# Patient Record
Sex: FEMALE | URBAN - METROPOLITAN AREA
[De-identification: names, ages, dates, MRNs, and addresses within clinical notes are randomized per-mention and may not be internally consistent; named-entity substitution may affect disease eponyms.]

---

## 2020-08-18 ENCOUNTER — TELEHEALTH PROVIDED OTHER THAN IN PATIENT'S HOME (OUTPATIENT)
Dept: URBAN - METROPOLITAN AREA TELEHEALTH 1 | Facility: TELEHEALTH | Age: 35
End: 2020-08-18
Payer: COMMERCIAL

## 2020-08-18 DIAGNOSIS — R10.30 LOWER ABDOMINAL PAIN, UNSPECIFIED: ICD-10-CM

## 2020-08-18 DIAGNOSIS — K52.9 NONINFECTIVE GASTROENTERITIS AND COLITIS, UNSPECIFIED: ICD-10-CM

## 2020-08-18 DIAGNOSIS — R15.2 FECAL URGENCY: ICD-10-CM

## 2020-08-18 DIAGNOSIS — K50.10 CROHN'S DISEASE OF LARGE INTESTINE WITHOUT COMPLICATIONS: ICD-10-CM

## 2020-08-18 PROCEDURE — 99203 OFFICE O/P NEW LOW 30 MIN: CPT | Mod: 95 | Performed by: INTERNAL MEDICINE

## 2020-09-28 ENCOUNTER — TRANSCRIBE ORDERS (OUTPATIENT)
Dept: ADMINISTRATIVE | Facility: HOSPITAL | Age: 35
End: 2020-09-28

## 2020-09-28 DIAGNOSIS — R74.8 ELEVATED LIVER ENZYMES: Primary | ICD-10-CM

## 2020-10-01 ENCOUNTER — HOSPITAL ENCOUNTER (OUTPATIENT)
Dept: ULTRASOUND IMAGING | Facility: HOSPITAL | Age: 35
Discharge: HOME | End: 2020-10-01
Admitting: INTERNAL MEDICINE

## 2020-10-01 DIAGNOSIS — R74.8 ELEVATED LIVER ENZYMES: ICD-10-CM

## 2020-10-01 PROCEDURE — 76705 ECHO EXAM OF ABDOMEN: CPT

## 2020-10-06 ENCOUNTER — LAB REQUISITION (OUTPATIENT)
Dept: LAB | Facility: HOSPITAL | Age: 35
End: 2020-10-06

## 2020-10-06 DIAGNOSIS — R19.8 OTHER SPECIFIED SYMPTOMS AND SIGNS INVOLVING THE DIGESTIVE SYSTEM AND ABDOMEN: ICD-10-CM

## 2020-10-06 DIAGNOSIS — D52.9 FOLATE DEFICIENCY ANEMIA, UNSPECIFIED: ICD-10-CM

## 2020-10-06 DIAGNOSIS — K50.10 CROHN'S DISEASE OF LARGE INTESTINE WITHOUT COMPLICATIONS (HCC): ICD-10-CM

## 2020-10-06 PROCEDURE — 83993 ASSAY FOR CALPROTECTIN FECAL: CPT | Performed by: INTERNAL MEDICINE

## 2020-10-13 LAB — CALPROTECTIN STL-MCNT: <16 UG/G (ref 0–120)

## 2020-11-10 ENCOUNTER — TELEHEALTH PROVIDED OTHER THAN IN PATIENT'S HOME (OUTPATIENT)
Dept: URBAN - METROPOLITAN AREA TELEHEALTH 1 | Facility: TELEHEALTH | Age: 35
End: 2020-11-10
Payer: COMMERCIAL

## 2020-11-10 DIAGNOSIS — R19.8 OTHER SPECIFIED SYMPTOMS AND SIGNS INVOLVING THE DIGESTIVE S: ICD-10-CM

## 2020-11-10 DIAGNOSIS — K76.0 FATTY (CHANGE OF) LIVER, NOT ELSEWHERE CLASSIFIED: ICD-10-CM

## 2020-11-10 DIAGNOSIS — K50.10 CROHN'S DISEASE OF LARGE INTESTINE WITHOUT COMPLICATIONS: ICD-10-CM

## 2020-11-10 DIAGNOSIS — K70.9 ALCOHOLIC LIVER DISEASE, UNSPECIFIED: ICD-10-CM

## 2020-11-10 PROCEDURE — 99214 OFFICE O/P EST MOD 30 MIN: CPT | Mod: 95 | Performed by: INTERNAL MEDICINE

## 2020-12-07 ENCOUNTER — TRANSCRIBE ORDERS (OUTPATIENT)
Dept: LAB | Facility: HOSPITAL | Age: 35
End: 2020-12-07

## 2020-12-07 ENCOUNTER — LAB (OUTPATIENT)
Dept: LAB | Facility: HOSPITAL | Age: 35
End: 2020-12-07

## 2020-12-07 DIAGNOSIS — R19.8 ANISMUS: ICD-10-CM

## 2020-12-07 DIAGNOSIS — K76.0 STEATOSIS OF LIVER: ICD-10-CM

## 2020-12-07 DIAGNOSIS — K50.10 CROHN'S DISEASE OF LARGE INTESTINE WITHOUT COMPLICATION (HCC): ICD-10-CM

## 2020-12-07 DIAGNOSIS — K70.9 ALCOHOL LIVER DAMAGE (HCC): ICD-10-CM

## 2020-12-07 DIAGNOSIS — K50.10 CROHN'S DISEASE OF LARGE INTESTINE WITHOUT COMPLICATION (HCC): Primary | ICD-10-CM

## 2020-12-07 PROCEDURE — 83010 ASSAY OF HAPTOGLOBIN QUANT: CPT

## 2020-12-07 PROCEDURE — 84478 ASSAY OF TRIGLYCERIDES: CPT

## 2020-12-07 PROCEDURE — 84450 TRANSFERASE (AST) (SGOT): CPT

## 2020-12-07 PROCEDURE — 83883 ASSAY NEPHELOMETRY NOT SPEC: CPT

## 2020-12-07 PROCEDURE — 82977 ASSAY OF GGT: CPT

## 2020-12-07 PROCEDURE — 84460 ALANINE AMINO (ALT) (SGPT): CPT

## 2020-12-07 PROCEDURE — 82947 ASSAY GLUCOSE BLOOD QUANT: CPT

## 2020-12-07 PROCEDURE — 82247 BILIRUBIN TOTAL: CPT

## 2020-12-07 PROCEDURE — 82172 ASSAY OF APOLIPOPROTEIN: CPT

## 2020-12-07 PROCEDURE — 82465 ASSAY BLD/SERUM CHOLESTEROL: CPT

## 2020-12-07 PROCEDURE — 36415 COLL VENOUS BLD VENIPUNCTURE: CPT

## 2020-12-10 LAB
A2 MACROGLOB SERPL-MCNC: 146 MG/DL (ref 110–276)
ALT SERPL W P-5'-P-CCNC: 160 IU/L (ref 0–40)
APO A-I SERPL-MCNC: 183 MG/DL (ref 116–209)
ASH SCORING: ABNORMAL
AST SERPL W P-5'-P-CCNC: 87 IU/L (ref 0–40)
BILIRUB SERPL-MCNC: 0.6 MG/DL (ref 0–1.2)
CHOLEST SERPL-MCNC: 263 MG/DL (ref 100–199)
FIBROSIS SCORING:: ABNORMAL
FIBROSIS STAGE SERPL QL: ABNORMAL
GGT SERPL-CCNC: 144 IU/L (ref 0–60)
GLUCOSE SERPL-MCNC: 111 MG/DL (ref 65–99)
HAPTOGLOB SERPL-MCNC: 102 MG/DL (ref 33–278)
INTERPRETATION: ABNORMAL
LABORATORY COMMENT REPORT: ABNORMAL
LIVER FIBR SCORE SERPL CALC.FIBROSURE: 0.12 (ref 0–0.21)
NECROINFLAMMATORY ACT GRADE SERPL QL: ABNORMAL
NECROINFLAMMATORY ACT SCORE SERPL: 0 (ref 0–17)
SERVICE CMNT-IMP: ABNORMAL
STEATOSIS GRADE (REFERENCE): ABNORMAL
STEATOSIS GRADING (REFERENCE): ABNORMAL
STEATOSIS SCORE (REFERENCE): 0.83 (ref 0–0.3)
TRIGL SERPL-MCNC: 149 MG/DL (ref 0–149)

## 2021-04-30 RX ORDER — ESCITALOPRAM OXALATE 20 MG/1
20 TABLET ORAL DAILY
COMMUNITY
End: 2022-04-07

## 2021-04-30 RX ORDER — CLOTRIMAZOLE 10 MG/1
10 LOZENGE ORAL; TOPICAL
COMMUNITY

## 2021-04-30 RX ORDER — PROPRANOLOL HYDROCHLORIDE 40 MG/1
40 TABLET ORAL DAILY PRN
COMMUNITY

## 2021-04-30 RX ORDER — FLUOCINOLONE ACETONIDE 0.1 MG/ML
SOLUTION TOPICAL 2 TIMES DAILY
COMMUNITY
End: 2022-04-07

## 2021-04-30 RX ORDER — KETOCONAZOLE 20 MG/ML
SHAMPOO TOPICAL 2 TIMES WEEKLY
COMMUNITY
End: 2022-04-07

## 2021-04-30 RX ORDER — PANTOPRAZOLE SODIUM 40 MG/1
40 TABLET, DELAYED RELEASE ORAL DAILY
COMMUNITY
End: 2021-05-04 | Stop reason: SDUPTHER

## 2021-04-30 RX ORDER — USTEKINUMAB 90 MG/ML
INJECTION, SOLUTION SUBCUTANEOUS
COMMUNITY
End: 2021-11-03 | Stop reason: SDUPTHER

## 2021-04-30 RX ORDER — LEVOTHYROXINE SODIUM 175 UG/1
175 TABLET ORAL DAILY
COMMUNITY

## 2021-04-30 RX ORDER — BUSPIRONE HYDROCHLORIDE 10 MG/1
10 TABLET ORAL 3 TIMES DAILY
COMMUNITY
End: 2022-04-07

## 2021-04-30 RX ORDER — KETOCONAZOLE 20 MG/G
CREAM TOPICAL DAILY
COMMUNITY

## 2021-04-30 RX ORDER — MELATONIN
1000 DAILY
COMMUNITY

## 2021-05-04 ENCOUNTER — OFFICE VISIT (OUTPATIENT)
Dept: GASTROENTEROLOGY | Facility: CLINIC | Age: 36
End: 2021-05-04

## 2021-05-04 ENCOUNTER — LAB (OUTPATIENT)
Dept: LAB | Facility: HOSPITAL | Age: 36
End: 2021-05-04

## 2021-05-04 VITALS
WEIGHT: 184 LBS | TEMPERATURE: 98 F | SYSTOLIC BLOOD PRESSURE: 112 MMHG | DIASTOLIC BLOOD PRESSURE: 80 MMHG | HEIGHT: 63 IN | BODY MASS INDEX: 32.6 KG/M2

## 2021-05-04 DIAGNOSIS — Z12.11 ENCOUNTER FOR SCREENING FOR MALIGNANT NEOPLASM OF COLON: ICD-10-CM

## 2021-05-04 DIAGNOSIS — K50.80 CROHN'S DISEASE OF BOTH SMALL AND LARGE INTESTINE WITHOUT COMPLICATION (HCC): Primary | ICD-10-CM

## 2021-05-04 DIAGNOSIS — E66.09 CLASS 1 OBESITY DUE TO EXCESS CALORIES WITHOUT SERIOUS COMORBIDITY WITH BODY MASS INDEX (BMI) OF 32.0 TO 32.9 IN ADULT: ICD-10-CM

## 2021-05-04 DIAGNOSIS — K50.80 CROHN'S DISEASE OF BOTH SMALL AND LARGE INTESTINE WITHOUT COMPLICATION (HCC): ICD-10-CM

## 2021-05-04 DIAGNOSIS — K21.9 GASTROESOPHAGEAL REFLUX DISEASE WITHOUT ESOPHAGITIS: ICD-10-CM

## 2021-05-04 DIAGNOSIS — R79.89 ELEVATED LFTS: ICD-10-CM

## 2021-05-04 LAB
25(OH)D3 SERPL-MCNC: 34.5 NG/ML (ref 30–100)
ALBUMIN SERPL-MCNC: 4.5 G/DL (ref 3.5–5.2)
ALBUMIN/GLOB SERPL: 1.5 G/DL
ALP SERPL-CCNC: 76 U/L (ref 39–117)
ALT SERPL W P-5'-P-CCNC: 107 U/L (ref 1–33)
ANION GAP SERPL CALCULATED.3IONS-SCNC: 12.5 MMOL/L (ref 5–15)
AST SERPL-CCNC: 92 U/L (ref 1–32)
BILIRUB SERPL-MCNC: 0.5 MG/DL (ref 0–1.2)
BUN SERPL-MCNC: 12 MG/DL (ref 6–20)
BUN/CREAT SERPL: 14.3 (ref 7–25)
CALCIUM SPEC-SCNC: 9.1 MG/DL (ref 8.6–10.5)
CERULOPLASMIN SERPL-MCNC: 32 MG/DL (ref 19–39)
CHLORIDE SERPL-SCNC: 102 MMOL/L (ref 98–107)
CO2 SERPL-SCNC: 24.5 MMOL/L (ref 22–29)
CREAT SERPL-MCNC: 0.84 MG/DL (ref 0.57–1)
FERRITIN SERPL-MCNC: 192 NG/ML (ref 13–150)
FOLATE SERPL-MCNC: <2 NG/ML (ref 4.78–24.2)
GFR SERPL CREATININE-BSD FRML MDRD: 76 ML/MIN/1.73
GLOBULIN UR ELPH-MCNC: 3 GM/DL
GLUCOSE SERPL-MCNC: 92 MG/DL (ref 65–99)
HBV SURFACE AB SER RIA-ACNC: REACTIVE
HBV SURFACE AG SERPL QL IA: NORMAL
HCV AB SER DONR QL: NORMAL
IRON 24H UR-MRATE: 77 MCG/DL (ref 37–145)
IRON SATN MFR SERPL: 21 % (ref 20–50)
POTASSIUM SERPL-SCNC: 4 MMOL/L (ref 3.5–5.2)
PROT SERPL-MCNC: 7.5 G/DL (ref 6–8.5)
SODIUM SERPL-SCNC: 139 MMOL/L (ref 136–145)
TIBC SERPL-MCNC: 361 MCG/DL (ref 298–536)
TRANSFERRIN SERPL-MCNC: 242 MG/DL (ref 200–360)
VIT B12 BLD-MCNC: 529 PG/ML (ref 211–946)

## 2021-05-04 PROCEDURE — 82728 ASSAY OF FERRITIN: CPT

## 2021-05-04 PROCEDURE — 80053 COMPREHEN METABOLIC PANEL: CPT

## 2021-05-04 PROCEDURE — 83516 IMMUNOASSAY NONANTIBODY: CPT

## 2021-05-04 PROCEDURE — 99204 OFFICE O/P NEW MOD 45 MIN: CPT | Performed by: INTERNAL MEDICINE

## 2021-05-04 PROCEDURE — 82306 VITAMIN D 25 HYDROXY: CPT | Performed by: INTERNAL MEDICINE

## 2021-05-04 PROCEDURE — 85025 COMPLETE CBC W/AUTO DIFF WBC: CPT

## 2021-05-04 PROCEDURE — 82746 ASSAY OF FOLIC ACID SERUM: CPT

## 2021-05-04 PROCEDURE — 84466 ASSAY OF TRANSFERRIN: CPT

## 2021-05-04 PROCEDURE — 86038 ANTINUCLEAR ANTIBODIES: CPT

## 2021-05-04 PROCEDURE — 36415 COLL VENOUS BLD VENIPUNCTURE: CPT

## 2021-05-04 PROCEDURE — 83540 ASSAY OF IRON: CPT

## 2021-05-04 PROCEDURE — 86706 HEP B SURFACE ANTIBODY: CPT

## 2021-05-04 PROCEDURE — 82607 VITAMIN B-12: CPT

## 2021-05-04 PROCEDURE — 86704 HEP B CORE ANTIBODY TOTAL: CPT

## 2021-05-04 PROCEDURE — 86708 HEPATITIS A ANTIBODY: CPT

## 2021-05-04 PROCEDURE — 85007 BL SMEAR W/DIFF WBC COUNT: CPT

## 2021-05-04 PROCEDURE — 82390 ASSAY OF CERULOPLASMIN: CPT

## 2021-05-04 PROCEDURE — 86803 HEPATITIS C AB TEST: CPT

## 2021-05-04 PROCEDURE — 82104 ALPHA-1-ANTITRYPSIN PHENO: CPT

## 2021-05-04 PROCEDURE — 82103 ALPHA-1-ANTITRYPSIN TOTAL: CPT

## 2021-05-04 PROCEDURE — 87340 HEPATITIS B SURFACE AG IA: CPT

## 2021-05-04 RX ORDER — PANTOPRAZOLE SODIUM 20 MG/1
40 TABLET, DELAYED RELEASE ORAL DAILY
Qty: 30 TABLET | Refills: 5 | Status: SHIPPED | OUTPATIENT
Start: 2021-05-04 | End: 2021-08-11

## 2021-05-04 NOTE — PROGRESS NOTES
New Patient Consult      Date: 2021   Patient Name: Ashley Riley  MRN: [id number]  : 1985     Referring Physician: Irish Villatoro*    Chief Complaint   Patient presents with   • Crohn's Disease       History of Present Illness: Ashley Riley is a 36 y.o. female who is here today to establish care with Gastroenterology for further evaluation management of her Crohn's disease and suspected nonalcoholic fatty liver disease.  She was diagnosed with Crohn's disease of the small and large intestine in  with a colonoscopy and biopsies In [place] at [facility] and .  She was followed at [facility] before.  She was on Cimzia for about 5 yrs and imuran for 3 yrs. She was put on stelara 2020. She is currently on Stelara 90 mg subcu inj.. She will have bowel movement 2-3 times per day, loose. No blood in stool. She had recent one episode of lower abdomen week ago that has resolved. She did have intraabdominal abscess in   And had a drainage. No bowel resection.     This patient deny any melena.  Weight is stable. Pt denies nausea vomiting or odynophagia or dysphagia. There is history of acid reflux under well control with ppi. There is no history of anemia. No recent history of EGD. Last colonoscopy was  at [place]. As per patient there was not much inflammation.   Family history of colon cancer- Grandfather had colon Cancer and father had stomach cancer. No history of any abdominal surgery. She will have wine couple of glass daily and she is a chronic smoker.     Subjective      Past Medical History:   Past Medical History:   Diagnosis Date   • Alcoholic liver damage (CMS/HCC)    • Anxiety    • Bipolar disorder (CMS/HCC)    • Bronchitis    • Crohn's disease (CMS/HCC)    • Depression    • Drug-induced neutropenia (CMS/HCC)    • H/O malignant neoplasm of thyroid    • Nasal lesion    • WEATHERS (nonalcoholic steatohepatitis)    • Olecranon bursitis    • Vitamin  B12 deficiency        Past Surgical History:   Past Surgical History:   Procedure Laterality Date   • COLONOSCOPY     • ENDOSCOPY     • THYROIDECTOMY         Family History: History reviewed. No pertinent family history.    Social History:   Social History     Socioeconomic History   • Marital status: Single     Spouse name: Not on file   • Number of children: Not on file   • Years of education: Not on file   • Highest education level: Not on file   Tobacco Use   • Smoking status: Current Every Day Smoker   • Smokeless tobacco: Never Used   Substance and Sexual Activity   • Alcohol use: Yes     Comment: DAILY ALCOHOL USE   • Drug use: Never   • Sexual activity: Defer         Current Outpatient Medications:   •  busPIRone (BUSPAR) 10 MG tablet, Take 10 mg by mouth 3 (Three) Times a Day., Disp: , Rfl:   •  cholecalciferol (VITAMIN D3) 25 MCG (1000 UT) tablet, Take 1,000 Units by mouth Daily., Disp: , Rfl:   •  clotrimazole (MYCELEX) 10 MG amos, Take 10 mg by mouth 5 (Five) Times a Day., Disp: , Rfl:   •  Cyanocobalamin 1000 MCG/ML kit, Inject  as directed., Disp: , Rfl:   •  escitalopram (LEXAPRO) 20 MG tablet, Take 20 mg by mouth Daily., Disp: , Rfl:   •  fluocinolone (SYNALAR) 0.01 % external solution, Apply  topically to the appropriate area as directed 2 (Two) Times a Day., Disp: , Rfl:   •  ketoconazole (NIZORAL) 2 % cream, Apply  topically to the appropriate area as directed Daily., Disp: , Rfl:   •  ketoconazole (NIZORAL) 2 % shampoo, Apply  topically to the appropriate area as directed 2 (Two) Times a Week., Disp: , Rfl:   •  levothyroxine (SYNTHROID, LEVOTHROID) 175 MCG tablet, Take 175 mcg by mouth Daily., Disp: , Rfl:   •  mupirocin (BACTROBAN) 2 % ointment, Apply  topically to the appropriate area as directed 3 (Three) Times a Day., Disp: , Rfl:   •  pantoprazole (PROTONIX) 20 MG EC tablet, Take 2 tablets by mouth Daily., Disp: 30 tablet, Rfl: 5  •  propranolol (INDERAL) 40 MG tablet, Take 40 mg by mouth  "3 (Three) Times a Day., Disp: , Rfl:   •  Ustekinumab (Stelara) 90 MG/ML solution prefilled syringe Injection, Inject  under the skin into the appropriate area as directed., Disp: , Rfl:     Allergies   Allergen Reactions   • Remicade [Infliximab] Anaphylaxis     Ask patient        Review of Systems:   Review of Systems   Constitutional: Negative for appetite change, fatigue, fever and unexpected weight loss.   HENT: Negative for trouble swallowing.    Respiratory: Negative for cough, shortness of breath and wheezing.    Cardiovascular: Negative for chest pain, palpitations and leg swelling.   Gastrointestinal: Positive for abdominal pain, constipation, diarrhea, nausea and GERD. Negative for abdominal distention, anal bleeding, blood in stool, rectal pain, vomiting and indigestion.   Genitourinary: Negative for dysuria, frequency and hematuria.   Musculoskeletal: Negative for back pain and joint swelling.   Skin: Negative for color change, rash and skin lesions.   Neurological: Negative for dizziness, syncope, speech difficulty, weakness, headache and memory problem.   Hematological: Negative for adenopathy. Does not bruise/bleed easily.   Psychiatric/Behavioral: Negative for agitation, behavioral problems, suicidal ideas and depressed mood.       The following portions of the patient's history were reviewed and updated as appropriate: allergies, current medications, past family history, past medical history, past social history, past surgical history and problem list.    Objective     Physical Exam:  Vital Signs:   Vitals:    05/04/21 1356   BP: 112/80   Temp: 98 °F (36.7 °C)   TempSrc: Temporal   Weight: 83.5 kg (184 lb)   Height: 160 cm (63\")       Physical Exam  Vitals and nursing note reviewed.   Constitutional:       Appearance: She is well-developed. She is obese.   HENT:      Head: Normocephalic and atraumatic.      Right Ear: External ear normal.      Left Ear: External ear normal.   Eyes:      " Conjunctiva/sclera: Conjunctivae normal.   Neck:      Thyroid: No thyromegaly.      Trachea: No tracheal deviation.   Cardiovascular:      Rate and Rhythm: Normal rate and regular rhythm.      Heart sounds: No murmur heard.     Pulmonary:      Effort: Pulmonary effort is normal. No respiratory distress.      Breath sounds: Normal breath sounds.   Abdominal:      General: Bowel sounds are normal. There is no distension.      Palpations: Abdomen is soft. There is no mass.      Tenderness: There is abdominal tenderness (Mild diffuse discomfort on the palpation).      Hernia: No hernia is present.   Musculoskeletal:         General: Normal range of motion.      Cervical back: Normal range of motion.   Skin:     General: Skin is warm and dry.   Neurological:      General: No focal deficit present.      Mental Status: She is alert and oriented to person, place, and time.      Cranial Nerves: No cranial nerve deficit.      Sensory: No sensory deficit.   Psychiatric:         Mood and Affect: Mood normal.         Behavior: Behavior normal.         Thought Content: Thought content normal.         Judgment: Judgment normal.         Results Review:   I have reviewed the patient's new clinical and imaging results and agree with the interpretation.     No visits with results within 90 Day(s) from this visit.   Latest known visit with results is:   Lab on 12/07/2020   Component Date Value Ref Range Status   • Fibrosis Score (References) 12/07/2020 0.12  0.00 - 0.21 Final   • Fibrosis Stage (Reference) 12/07/2020 Comment   Final                       F0 - No fibrosis   • Steatosis Score (Reference) 12/07/2020 0.83* 0.00 - 0.30 Final   • Steatosis Grade (Reference) 12/07/2020 Comment   Final                S3 - Marked or Severe Steatosis   • MELVIN Score 12/07/2020 0.00  0.00 - 17.00 Final   • MELVIN Grade 12/07/2020 Comment   Final                          H0 - No MELVIN   • Height: (Reference) 12/07/2020 63  in Final   • Weight:  (Reference) 12/07/2020 188  LBS Final   • Alpha 2-Macroglobulins, Qn 12/07/2020 146  110 - 276 mg/dL Final   • Haptoglobin 12/07/2020 102  33 - 278 mg/dL Final   • Apolipoprotein A-1 12/07/2020 183  116 - 209 mg/dL Final   • Total Bilirubin 12/07/2020 0.6  0.0 - 1.2 mg/dL Final   • GGT 12/07/2020 144* 0 - 60 IU/L Final   • ALT (SGPT) 12/07/2020 160* 0 - 40 IU/L Final   • AST (SGOT) P5P (Reference) 12/07/2020 87* 0 - 40 IU/L Final   • Cholesterol, Total (Reference) 12/07/2020 263* 100 - 199 mg/dL Final   • Glucose, Serum (Reference) 12/07/2020 111* 65 - 99 mg/dL Final   • Triglycerides 12/07/2020 149  0 - 149 mg/dL Final   • Interpretation 12/07/2020 Comment   Final    Comment: Quantitative results of 10 biochemicals in combination with age,  gender, height and weight, are analyzed using a computational  algorithm to provide a quantitative surrogate marker (0.0-1.0) of  liver fibrosis (Metavir F0-F4), hepatic steatosis (0.0-1.0, S0-S3),  and Alcoholic Steato-Hepatitis (MELVIN) (0.0-1.0, H0-H3).  Fibrosis Marker:  In a study of 221 alcoholic patients where 63% had  significant alcoholic fibrosis (Metavir F2-F4) and 31% had cirrhosis  by liver biopsy, a fibrosis result of >0.3 yielded a sensitivity of  84% and a specificity of 66% for the detection of significant  fibrosis.  A fibrosis result of >0.7 yielded a sensitivity of 91% and  a specificity of 87% for detection of cirrhosis(1).  Steatosis Marker:  In a population of 744 patients (583 HCV, 18 HBV,  69 NAFLD, and 74 alcoholic disease patients), where 36% had  significant steatosis (>5%) on a liver biopsy, a steatosis score >0.5  had a sensitivity of 71% and a specificity of 72% for identification  of                            significant steatosis(2).  MELVIN Marker:  In a population of 225 alcoholic patients where 34% had  alcoholic hepatitis features (polynuclear neutrophil infiltrate and  hepatocellular necrosis) by liver biopsy, an Alcoholic Steato-  Hepatitis  value >0.5 had a sensitivity of 80% and a specificity of 84%  in identifying alcoholic steato-hepatitis(3).   • Fibrosis Scorin2020 Comment   Final          <0.21 = Stage F0 - No fibrosis  0.21 - 0.27 = Stage F0 - F1  0.27 - 0.31 = Stage F1 - Portal fibrosis  0.31 - 0.48 = Stage F1 - F2  0.48 - 0.58 = Stage F2 - Bridging fibrosis with few septa  0.58 - 0.72 = Stage F3 - Bridging fibrosis with many septa  0.72 - 0.74 = Stage F3 - F4        >0.74 = Stage F4 - Cirrhosis   • Steatosis Grading (Reference) 2020 Comment   Final          < 0.30 = S0 - No Steatosis  0.30 to 0.38 = S0 - S1  0.38 to 0.48 = S1 - Minimal Steatosis  0.48 to 0.57 = S1 - S2  0.57 to 0.67 = S2 - Moderate Steatosis  0.67 to 0.69 = S2 - S3        > 0.69 = S3 - Marked or Severe Steatosis   • MELVIN Scoring 2020 Comment   Final            < 0.1700 = H0 - No MELVIN  0.1700 to 0.5535 = H1 - Mild MELVIN  0.5535 to 0.7800 = H2 - Moderate MELVIN          > 0.7800 = H3 - Severe MELVIN   • Limitations: (Reference) 2020 Comment   Final    MELVIN FibroSure is recommended for patients with suspected alcoholic  liver disease.  It is not recommended for patients with other liver  diseases.  It is also not recommended in patients with Gilbert  Disease, acute hemolysis, acute viral hepatitis, drug induced  hepatitis, genetic liver disease, autoimmune hepatitis, and/or extra-  hepatic cholestasis.  Any of these clinical situations may lead to  inaccurate quantitative predictions of fibrosis.   • Comment (Reference) 2020 Comment   Final    This test was developed and its performance characteristics determined  by [facility].  It has not been cleared or approved by the Food and Drug  Administration.  The FDA has determined that such clearance or  approval is not necessary.  For questions regarding this report please contact customer service  at [phone].  References:  1.  Claudia Hargrove al. Biomarkers for the Prediction of Liver Fibrosis  in Patients  with Chronic Alcoholic Liver Disease.  Clinical  Gastroenterol. / Hepatol. 2005;3:167-174.  2.  DAMIAN Cummins. et al. The Diagnostic Value of Biomarkers (Steato  Test) for the Prediction of Liver Steatosis.  Comparative Hepatol.  2005; 4:10.  3.  Spencer RICKETTS et al. The Diagnostic Value of Biomarkers (North Test) for  the Prediction of Alcoholic Steato-hepatitis in Patients with Chronic  Alcoholic Liver Disease.  J Hepatol. 2006;44:1175-85.      No radiology results for the last 90 days.    Assessment / Plan      Assessment & Plan:  1. Crohn's disease of both small and large intestine without complication (CMS/HCC)  2.  Suspected mild irritable bowel  Patient was diagnosed with ileocolonic Crohn's disease in 2014 at [facility].  She was initially on a Cimzia and Imuran.  It is unclear why the Cimzia was changed to Stelara.  Patient could not give a clear answer on that.  She has been taking Stelara since January 2020.  She did have a prior history of Crohn's abscess in 2016 and had a drainage but no bowel resections.    She is clinically appears to be doing well with the Stelara now.  Her last dose was 2 weeks ago.  Last fecal calprotectin in October 2020 was normal.   Her history is suggestive of mild irritable bowel along with her IBD.  Her last colonoscopy was in 2019 as per patient and was been told that no significant inflammation was noted the colonoscopy.  No full report available to interpret.    Patient currently appears to be in a clinical remission without any significant signs of flare.     We will get a CBC CMP vitamin B12 level, folic acid level, vitamin D level  We will also get a stool calprotectin  We will continue her self Stelara 90 mg subcu q. 8 weeks  Colonoscopy in 2022  Follow-up in 3 months time    - Calprotectin, Fecal - Stool, Per Rectum; Future  - Comprehensive Metabolic Panel; Future  - CBC Auto Differential; Future  - Vitamin B12; Future  - Folate; Future  - Vitamin D  25 Hydroxy    2. Gastroesophageal reflux disease without esophagitis  Patient is a chronic smoker and chronic alcoholic.   We had a detailed discussion today to cut down both and to reduce the Protonix to 20 mg p.o. daily for now    3. Elevated LFTs  AIH versus Mckeon  Hepatocellular pattern of elevated liver enzymes with ALT more than 45 times upper limit of normal.  This is concerning for autoimmune hepatitis.   Her Mckeon fibrosure done by PCP revealed N3 steatosis without any fibrosis or steatohepatitis  We will get the rest of the blood work done to rule out other etiologies.   Meantime we had a detailed discussion on reducing weight at least 10 pounds for the next 6 months time.  Also discussed on cut down or stop smoking and cut down the alcohol use during the progression of the liver disease.    - Hepatitis A Antibody, Total; Future  - Hepatitis B Core Antibody, Total; Future  - Hepatitis B Surface Antibody; Future  - Hepatitis B Surface Antigen; Future  - Ceruloplasmin; Future  - JUAN; Future  - Anti-Smooth Muscle Antibody Titer; Future  - Mitochondrial Antibodies, M2; Future  - Hepatitis C Antibody; Future  - Ferritin; Future  - Iron Profile; Future  - Alpha - 1 - Antitrypsin Phenotype; Future  - Alpha - 1 - Antitrypsin; Future    4. Encounter for screening for malignant neoplasm of colon  5.  Family history of colon cancer  Last colonoscopy was 2019 no polyps removed.  Her grandfather had a colon cancer she is due for surveillance colonoscopy next year in 2022    6. Class 1 obesity due to excess calories without serious comorbidity with body mass index (BMI) of 32.0 to 32.9 in adult  Advised regular exercise half hour every day at least 3 days in a week.   Reduced calorie intake  and lifestyle and dietary changes  have been discussed  Wt reduction of at least 5-7% of the current body weight has been discussed.   Advised to avoid alcohol and smoking cigarette      Follow Up:   Return in about 3 months (around  8/4/2021).    Abhay Rascon MD  Gastroenterology [place]  5/4/2021  15:08 EDT    Please note that portions of this note may have been completed with a voice recognition program.

## 2021-05-05 LAB
ANISOCYTOSIS BLD QL: ABNORMAL
BASOPHILS # BLD MANUAL: 0.05 10*3/MM3 (ref 0–0.2)
BASOPHILS NFR BLD AUTO: 2 % (ref 0–1.5)
DACRYOCYTES BLD QL SMEAR: ABNORMAL
DEPRECATED RDW RBC AUTO: 64.8 FL (ref 37–54)
EOSINOPHIL # BLD MANUAL: 0.02 10*3/MM3 (ref 0–0.4)
EOSINOPHIL NFR BLD MANUAL: 1 % (ref 0.3–6.2)
ERYTHROCYTE [DISTWIDTH] IN BLOOD BY AUTOMATED COUNT: 16.1 % (ref 12.3–15.4)
HCT VFR BLD AUTO: 36.9 % (ref 34–46.6)
HGB BLD-MCNC: 12.7 G/DL (ref 12–15.9)
LYMPHOCYTES # BLD MANUAL: 1.14 10*3/MM3 (ref 0.7–3.1)
LYMPHOCYTES NFR BLD MANUAL: 46 % (ref 19.6–45.3)
LYMPHOCYTES NFR BLD MANUAL: 7 % (ref 5–12)
MACROCYTES BLD QL SMEAR: ABNORMAL
MCH RBC QN AUTO: 37.9 PG (ref 26.6–33)
MCHC RBC AUTO-ENTMCNC: 34.4 G/DL (ref 31.5–35.7)
MCV RBC AUTO: 110.1 FL (ref 79–97)
MONOCYTES # BLD AUTO: 0.17 10*3/MM3 (ref 0.1–0.9)
NEUTROPHILS # BLD AUTO: 1.09 10*3/MM3 (ref 1.7–7)
NEUTROPHILS NFR BLD MANUAL: 44 % (ref 42.7–76)
PLAT MORPH BLD: NORMAL
PLATELET # BLD AUTO: 213 10*3/MM3 (ref 140–450)
PMV BLD AUTO: 10.6 FL (ref 6–12)
POLYCHROMASIA BLD QL SMEAR: ABNORMAL
RBC # BLD AUTO: 3.35 10*6/MM3 (ref 3.77–5.28)
WBC # BLD AUTO: 2.48 10*3/MM3 (ref 3.4–10.8)
WBC MORPH BLD: NORMAL

## 2021-05-05 PROCEDURE — 83993 ASSAY FOR CALPROTECTIN FECAL: CPT

## 2021-05-06 LAB
ANA SER QL: NEGATIVE
HAV AB SER QL IA: NEGATIVE
HBV CORE AB SERPL QL IA: NEGATIVE

## 2021-05-07 LAB
ACTIN IGG SERPL-ACNC: 12 UNITS (ref 0–19)
CALPROTECTIN STL-MCNT: 42 UG/G (ref 0–120)

## 2021-05-08 LAB — MITOCHONDRIA M2 IGG SER-ACNC: <20 UNITS (ref 0–20)

## 2021-05-10 LAB
A1AT PHENOTYP SERPL IFE: NORMAL
A1AT SERPL-MCNC: 125 MG/DL (ref 100–188)

## 2021-08-04 ENCOUNTER — OFFICE VISIT (OUTPATIENT)
Dept: GASTROENTEROLOGY | Facility: CLINIC | Age: 36
End: 2021-08-04

## 2021-08-04 VITALS
DIASTOLIC BLOOD PRESSURE: 78 MMHG | SYSTOLIC BLOOD PRESSURE: 118 MMHG | TEMPERATURE: 97.7 F | HEIGHT: 63 IN | BODY MASS INDEX: 32.43 KG/M2 | WEIGHT: 183 LBS

## 2021-08-04 DIAGNOSIS — K50.80 CROHN'S DISEASE OF BOTH SMALL AND LARGE INTESTINE WITHOUT COMPLICATION (HCC): Primary | ICD-10-CM

## 2021-08-04 PROCEDURE — 99214 OFFICE O/P EST MOD 30 MIN: CPT | Performed by: INTERNAL MEDICINE

## 2021-08-04 RX ORDER — FOLIC ACID 1 MG/1
1 TABLET ORAL DAILY
Qty: 30 TABLET | Refills: 5 | Status: SHIPPED | OUTPATIENT
Start: 2021-08-04 | End: 2022-02-02 | Stop reason: SDUPTHER

## 2021-08-04 NOTE — PROGRESS NOTES
Follow Up Note     Date: 2021   Patient Name: Ashley Riley  MRN: [id number]  : 1985     Referring Physician: Irish Villatoro MD    Chief Complaint:    Chief Complaint   Patient presents with   • Follow-up   • Crohn's Disease       Interval History:   2021  Ashley Riley is a 36 y.o. female who is here today for follow up for her Crohn's disease.  She states that she has been having a regular bowel movement until recently.  After her recent vacation she developed some loose stools anywhere from 3 to 5/day without any blood without any abdominal pain.  She had recent blood work and she is here to discuss the reports and follow-up..    2021  Ashley Riley is a 36 y.o. female who is here today to establish care with Gastroenterology for further evaluation management of her Crohn's disease and suspected nonalcoholic fatty liver disease.  She was diagnosed with Crohn's disease of the small and large intestine in  with a colonoscopy and biopsies In [place] at [facility] and .  She was followed at [facility] colorectal [facility] before.  She was on Cimzia for about 5 yrs and imuran for 3 yrs. She was put on stelara 2020. She is currently on Stelara 90 mg subcu inj.. She will have bowel movement 2-3 times per day, loose. No blood in stool. She had recent one episode of lower abdomen week ago that has resolved. She did have intraabdominal abscess in   And had a drainage. No bowel resection.      This patient deny any melena.  Weight is stable. Pt denies nausea vomiting or odynophagia or dysphagia. There is history of acid reflux under well control with ppi. There is no history of anemia. No recent history of EGD. Last colonoscopy was  at [place]. As per patient there was not much inflammation.   Family history of colon cancer- Grandfather had colon Cancer and father had stomach cancer. No history of any abdominal surgery. She will have wine couple of glass daily and  she is a chronic smoker.     Subjective      Past Medical History:   Past Medical History:   Diagnosis Date   • Alcoholic liver damage (CMS/HCC)    • Anxiety    • Bipolar disorder (CMS/HCC)    • Bronchitis    • Crohn's disease (CMS/HCC)    • Depression    • Drug-induced neutropenia (CMS/HCC)    • H/O malignant neoplasm of thyroid    • Nasal lesion    • WEATHERS (nonalcoholic steatohepatitis)    • Olecranon bursitis    • Vitamin B12 deficiency      Past Surgical History:   Past Surgical History:   Procedure Laterality Date   • COLONOSCOPY     • ENDOSCOPY     • THYROIDECTOMY         Family History: History reviewed. No pertinent family history.    Social History:   Social History     Socioeconomic History   • Marital status: Single     Spouse name: Not on file   • Number of children: Not on file   • Years of education: Not on file   • Highest education level: Not on file   Tobacco Use   • Smoking status: Current Every Day Smoker   • Smokeless tobacco: Never Used   Substance and Sexual Activity   • Alcohol use: Yes     Comment: DAILY ALCOHOL USE   • Drug use: Never   • Sexual activity: Defer       Medications:     Current Outpatient Medications:   •  busPIRone (BUSPAR) 10 MG tablet, Take 10 mg by mouth 3 (Three) Times a Day., Disp: , Rfl:   •  cholecalciferol (VITAMIN D3) 25 MCG (1000 UT) tablet, Take 1,000 Units by mouth Daily., Disp: , Rfl:   •  clotrimazole (MYCELEX) 10 MG amos, Take 10 mg by mouth 5 (Five) Times a Day., Disp: , Rfl:   •  Cyanocobalamin 1000 MCG/ML kit, Inject  as directed., Disp: , Rfl:   •  escitalopram (LEXAPRO) 20 MG tablet, Take 20 mg by mouth Daily., Disp: , Rfl:   •  fluocinolone (SYNALAR) 0.01 % external solution, Apply  topically to the appropriate area as directed 2 (Two) Times a Day., Disp: , Rfl:   •  ketoconazole (NIZORAL) 2 % cream, Apply  topically to the appropriate area as directed Daily., Disp: , Rfl:   •  ketoconazole (NIZORAL) 2 % shampoo, Apply  topically to the appropriate area  "as directed 2 (Two) Times a Week., Disp: , Rfl:   •  levothyroxine (SYNTHROID, LEVOTHROID) 175 MCG tablet, Take 175 mcg by mouth Daily., Disp: , Rfl:   •  mupirocin (BACTROBAN) 2 % ointment, Apply  topically to the appropriate area as directed 3 (Three) Times a Day., Disp: , Rfl:   •  pantoprazole (PROTONIX) 20 MG EC tablet, Take 2 tablets by mouth Daily., Disp: 30 tablet, Rfl: 5  •  propranolol (INDERAL) 40 MG tablet, Take 40 mg by mouth 3 (Three) Times a Day., Disp: , Rfl:   •  Ustekinumab (Stelara) 90 MG/ML solution prefilled syringe Injection, Inject  under the skin into the appropriate area as directed., Disp: , Rfl:     Allergies:   Allergies   Allergen Reactions   • Remicade [Infliximab] Anaphylaxis     Ask patient        Review of Systems:   Review of Systems   Constitutional: Negative for appetite change, fatigue, fever and unexpected weight loss.   HENT: Negative for trouble swallowing.    Gastrointestinal: Positive for diarrhea. Negative for abdominal distention, abdominal pain, anal bleeding, blood in stool, constipation, nausea, rectal pain, vomiting, GERD and indigestion.       The following portions of the patient's history were reviewed and updated as appropriate: allergies, current medications, past family history, past medical history, past social history, past surgical history and problem list.    Objective     Physical Exam:  Vital Signs:   Vitals:    08/04/21 1508   BP: 118/78   Temp: 97.7 °F (36.5 °C)   TempSrc: Temporal   Weight: 83 kg (183 lb)   Height: 160 cm (63\")       Physical Exam  Vitals and nursing note reviewed.   Constitutional:       Appearance: Normal appearance. She is well-developed.   HENT:      Head: Normocephalic and atraumatic.   Eyes:      Conjunctiva/sclera: Conjunctivae normal.   Cardiovascular:      Rate and Rhythm: Normal rate and regular rhythm.   Abdominal:      General: Bowel sounds are normal. There is no distension.      Palpations: Abdomen is soft. There is no " mass.      Tenderness: There is no abdominal tenderness. There is no guarding or rebound.      Hernia: No hernia is present.   Musculoskeletal:      Cervical back: Normal range of motion and neck supple.   Neurological:      Mental Status: She is alert.         Results Review:   I reviewed the patient's new clinical results.    No visits with results within 90 Day(s) from this visit.   Latest known visit with results is:   Lab on 05/04/2021   Component Date Value Ref Range Status   • Glucose 05/04/2021 92  65 - 99 mg/dL Final   • BUN 05/04/2021 12  6 - 20 mg/dL Final   • Creatinine 05/04/2021 0.84  0.57 - 1.00 mg/dL Final   • Sodium 05/04/2021 139  136 - 145 mmol/L Final   • Potassium 05/04/2021 4.0  3.5 - 5.2 mmol/L Final   • Chloride 05/04/2021 102  98 - 107 mmol/L Final   • CO2 05/04/2021 24.5  22.0 - 29.0 mmol/L Final   • Calcium 05/04/2021 9.1  8.6 - 10.5 mg/dL Final   • Total Protein 05/04/2021 7.5  6.0 - 8.5 g/dL Final   • Albumin 05/04/2021 4.50  3.50 - 5.20 g/dL Final   • ALT (SGPT) 05/04/2021 107* 1 - 33 U/L Final   • AST (SGOT) 05/04/2021 92* 1 - 32 U/L Final   • Alkaline Phosphatase 05/04/2021 76  39 - 117 U/L Final   • Total Bilirubin 05/04/2021 0.5  0.0 - 1.2 mg/dL Final   • eGFR Non African Amer 05/04/2021 76  >60 mL/min/1.73 Final   • Globulin 05/04/2021 3.0  gm/dL Final   • A/G Ratio 05/04/2021 1.5  g/dL Final   • BUN/Creatinine Ratio 05/04/2021 14.3  7.0 - 25.0 Final   • Anion Gap 05/04/2021 12.5  5.0 - 15.0 mmol/L Final   • WBC 05/04/2021 2.48* 3.40 - 10.80 10*3/mm3 Final   • RBC 05/04/2021 3.35* 3.77 - 5.28 10*6/mm3 Final   • Hemoglobin 05/04/2021 12.7  12.0 - 15.9 g/dL Final   • Hematocrit 05/04/2021 36.9  34.0 - 46.6 % Final   • MCV 05/04/2021 110.1* 79.0 - 97.0 fL Final   • MCH 05/04/2021 37.9* 26.6 - 33.0 pg Final   • MCHC 05/04/2021 34.4  31.5 - 35.7 g/dL Final   • RDW 05/04/2021 16.1* 12.3 - 15.4 % Final   • RDW-SD 05/04/2021 64.8* 37.0 - 54.0 fl Final   • MPV 05/04/2021 10.6  6.0 - 12.0 fL  Final   • Platelets 05/04/2021 213  140 - 450 10*3/mm3 Final   • Vitamin B-12 05/04/2021 529  211 - 946 pg/mL Final   • Folate 05/04/2021 <2.00* 4.78 - 24.20 ng/mL Final   • Hep A Total Ab 05/04/2021 Negative  Negative Final   • Hep B Core Total Ab 05/04/2021 Negative  Negative Final   • Hep B S Ab 05/04/2021 Reactive* Non-Reactive Final   • Hepatitis B Surface Ag 05/04/2021 Non-Reactive  Non-Reactive Final   • Ceruloplasmin 05/04/2021 32  19 - 39 mg/dL Final   • JUAN Direct 05/04/2021 Negative  Negative Final   • Smooth Muscle Ab 05/04/2021 12  0 - 19 Units Final                     Negative                     0 - 19                   Weak positive               20 - 30                   Moderate to strong positive     >30   Actin Antibodies are found in 52-85% of patients with   autoimmune hepatitis or chronic active hepatitis and   in 22% of patients with primary biliary cirrhosis.   • Mitochondrial Ab 05/04/2021 <20.0  0.0 - 20.0 Units Final                                    Negative    0.0 - 20.0                                  Equivocal  20.1 - 24.9                                  Positive         >24.9  Mitochondrial (M2) Antibodies are found in 90-96% of  patients with primary biliary cirrhosis.   • Hepatitis C Ab 05/04/2021 Non-Reactive  Non-Reactive Final   • Ferritin 05/04/2021 192.00* 13.00 - 150.00 ng/mL Final   • Iron 05/04/2021 77  37 - 145 mcg/dL Final   • Iron Saturation 05/04/2021 21  20 - 50 % Final   • Transferrin 05/04/2021 242  200 - 360 mg/dL Final   • TIBC 05/04/2021 361  298 - 536 mcg/dL Final   • A-1 Antitrypsin 05/04/2021 125  100 - 188 mg/dL Final   • Phenotype 05/04/2021 MM   Final           Phenotype  Population     A-1-AT Concentration*                    Incidence %   % of MM (Typical Range)         MM            86.5%       100%     (96 - 189)         MS             8.0%        86%     (83 - 161)         MZ             3.9%        61%     (60 - 111)         FM             0.4%        100%     (93 - 191)         SZ             0.3%        41%     (42 -  75)         SS             0.1%        64%     (62 - 119)         ZZ             0.05%       19%     (16 -  38)         FS             0.05%       70%     (70 - 128)         FZ            Unknown      46%     (44 -  88)         FF            Unknown                Unknown  *A-1-AT concentration in the homozygous MM phenotype   is taken as the reference normal. Percent deficiency   in each phenotype is reported relative to this   reference. Ranges used to confirm phenotype.   • Neutrophil % 05/04/2021 44.0  42.7 - 76.0 % Final   • Lymphocyte % 05/04/2021 46.0* 19.6 - 45.3 % Final   • Monocyte % 05/04/2021 7.0  5.0 - 12.0 % Final   • Eosinophil % 05/04/2021 1.0  0.3 - 6.2 % Final   • Basophil % 05/04/2021 2.0* 0.0 - 1.5 % Final   • Neutrophils Absolute 05/04/2021 1.09* 1.70 - 7.00 10*3/mm3 Final   • Lymphocytes Absolute 05/04/2021 1.14  0.70 - 3.10 10*3/mm3 Final   • Monocytes Absolute 05/04/2021 0.17  0.10 - 0.90 10*3/mm3 Final   • Eosinophils Absolute 05/04/2021 0.02  0.00 - 0.40 10*3/mm3 Final   • Basophils Absolute 05/04/2021 0.05  0.00 - 0.20 10*3/mm3 Final   • Anisocytosis 05/04/2021 Slight/1+  None Seen Final   • Dacrocytes 05/04/2021 Slight/1+  None Seen Final   • Macrocytes 05/04/2021 Mod/2+  None Seen Final   • Polychromasia 05/04/2021 Slight/1+  None Seen Final   • WBC Morphology 05/04/2021 Normal  Normal Final   • Platelet Morphology 05/04/2021 Normal  Normal Final   • Calprotectin, Fecal 05/05/2021 42  0 - 120 ug/g Final    Concentration     Interpretation   Follow-Up  <16 - 50 ug/g     Normal           None  >50 -120 ug/g     Borderline       Re-evaluate in 4-6 weeks      >120 ug/g     Abnormal         Repeat as clinically                                     indicated      No radiology results for the last 90 days.    Assessment / Plan      1. Crohn's disease of both small and large intestine without complication (CMS/HCC)  2.   Suspected mild irritable bowel  3.  Low folate level  [facility] elast few weeks she will have loose stool 3-5 per day. Otherwise she was having solid stool.   Her stool calprotectin is 42 within normal limits.  Recent CBC, CMP reviewed unremarkable except elevated liver enzymes and borderline low WBCs 2.4.  Platelets were normal hemoglobin was normal.  Her folate level was low less than two.  But her vitamin B12 was normal.  Her history is not suggestive of IBD flare.  We will start her on half a pill of Imodium as needed  We will start her on folic acid 1 mg p.o. daily  We will repeat CMP, CBC in 6 months  Colonoscopy in 2022 next year    5/4/2021  Patient was diagnosed with ileocolonic Crohn's disease in 2014 at [facility].  She was initially on a Cimzia and Imuran.  It is unclear why the Cimzia was changed to Stelara.  Patient could not give a clear answer on that.  She has been taking Stelara since January 2020.  She did have a prior history of Crohn's abscess in 2016 and had a drainage but no bowel resections.     She is clinically appears to be doing well with the Stelara now.  Her last dose was 2 weeks ago.  Last fecal calprotectin in October 2020 was normal.   Her history is suggestive of mild irritable bowel along with her IBD.  Her last colonoscopy was in 2019 as per patient and was been told that no significant inflammation was noted the colonoscopy.  No full report available to interpret.     Patient currently appears to be in a clinical remission without any significant signs of flare.   We will get a CBC CMP vitamin B12 level, folic acid level, vitamin D level  We will also get a stool calprotectin  We will continue her self Stelara 90 mg subcu q. 8 weeks  Colonoscopy in 2022  Follow-up in 3 months time     3. Gastroesophageal reflux disease without esophagitis  8/4/2021   She is currently on low dose [id number] mg daily without any significant reflux symptoms  Will reassess  again in 6 months time and possibly consider as needed      5/4/2021  Patient is a chronic smoker and chronic alcoholic.   We had a detailed discussion today to cut down both and to reduce the Protonix to 20 mg p.o. daily for now     4.  Nonalcoholic fatty liver disease  8/4/2021  She is not immune to hepatitis B but immune to hepatitis A. hep C antibody is negative.  JUAN, anti-smooth muscle antibody, antimitochondrial antibodies negative ruling out autoimmune otitis and PBC.  Her cellular plasma level was normal alpha-1 antitrypsin level and phenotype was normal.  Iron saturation within normal limits ruling out hemochromatosis.  This is most likely nonalcoholic fatty liver disease.. Some element of medication causing this elevation including Lexapro cannot be ruled out.    She states that she got combine hep a and B vaccibe, booster dose is pending  Advised to cut down the alcohol use and cut down or stop smoking  Also discussed on losing weight at least 15 pounds over the next 6 months time.    5/4/2021  Hepatocellular pattern of elevated liver enzymes with ALT more than 4-5 times upper limit of normal.  This is concerning for autoimmune hepatitis.   Her Mckeon fibrosure done by PCP revealed N3 steatosis without any fibrosis or steatohepatitis  We will get the rest of the blood work done to rule out other etiologies.   Meantime we had a detailed discussion on reducing weight at least 10 pounds for the next 6 months time.  Also discussed on cut down or stop smoking and cut down the alcohol use during the progression of the liver disease.    Prior history  5. Encounter for screening for malignant neoplasm of colon  6.  Family history of colon cancer  Last colonoscopy was 2019 no polyps removed.  Her grandfather had a colon cancer she is due for surveillance colonoscopy next year in 2022     7. Class 1 obesity due to excess calories without serious comorbidity with body mass index (BMI) of 32.0 to 32.9 in  adult  Advised regular exercise half hour every day at least 3 days in a week.   Reduced calorie intake  and lifestyle and dietary changes  have been discussed  Wt reduction of at least 5-7% of the current body weight has been discussed.   Advised to avoid alcohol and smoking cigarette    Follow Up:   No follow-ups on file.    Abhay Rascon MD  [facility] [place]  8/4/2021  15:12 EDT     Please note that portions of this note may have been completed with a voice recognition program.

## 2021-08-05 ENCOUNTER — TELEPHONE (OUTPATIENT)
Dept: GASTROENTEROLOGY | Facility: CLINIC | Age: 36
End: 2021-08-05

## 2021-08-05 NOTE — TELEPHONE ENCOUNTER
----- Message from Abhay Rascon MD sent at 8/4/2021  7:33 PM EDT -----  Let her know that her folic acid level is low and I have started her on a folic acid 1 mg p.o. daily and prescription sent

## 2021-08-11 RX ORDER — PANTOPRAZOLE SODIUM 20 MG/1
40 TABLET, DELAYED RELEASE ORAL DAILY
Qty: 30 TABLET | Refills: 5 | Status: SHIPPED | OUTPATIENT
Start: 2021-08-11 | End: 2022-08-29

## 2021-11-03 RX ORDER — USTEKINUMAB 90 MG/ML
90 INJECTION, SOLUTION SUBCUTANEOUS
Qty: 1 ML | Refills: 1 | Status: SHIPPED | OUTPATIENT
Start: 2021-11-03 | End: 2022-02-14

## 2021-11-03 NOTE — TELEPHONE ENCOUNTER
PATIENT CALLED TODAY. SHE ASKS IF YOU CAN REFILL HER STELARA FOR HER? SHE WAS PREVIOUSLY GETTING IT THROUGH ANOTHER PROVIDER. SHE USES [facility] FOR THIS MED

## 2021-11-12 ENCOUNTER — TELEPHONE (OUTPATIENT)
Dept: GASTROENTEROLOGY | Facility: CLINIC | Age: 36
End: 2021-11-12

## 2022-02-02 ENCOUNTER — LAB (OUTPATIENT)
Dept: LAB | Facility: HOSPITAL | Age: 37
End: 2022-02-02

## 2022-02-02 ENCOUNTER — TRANSCRIBE ORDERS (OUTPATIENT)
Dept: LAB | Facility: HOSPITAL | Age: 37
End: 2022-02-02

## 2022-02-02 ENCOUNTER — OFFICE VISIT (OUTPATIENT)
Dept: GASTROENTEROLOGY | Facility: CLINIC | Age: 37
End: 2022-02-02

## 2022-02-02 VITALS
SYSTOLIC BLOOD PRESSURE: 124 MMHG | HEIGHT: 63 IN | DIASTOLIC BLOOD PRESSURE: 84 MMHG | BODY MASS INDEX: 30.83 KG/M2 | TEMPERATURE: 97.3 F | WEIGHT: 174 LBS

## 2022-02-02 DIAGNOSIS — K21.9 GASTROESOPHAGEAL REFLUX DISEASE WITHOUT ESOPHAGITIS: ICD-10-CM

## 2022-02-02 DIAGNOSIS — Z01.818 PRE-OP TESTING: ICD-10-CM

## 2022-02-02 DIAGNOSIS — E03.2 IATROGENIC HYPOTHYROIDISM: ICD-10-CM

## 2022-02-02 DIAGNOSIS — E78.5 HYPERLIPIDEMIA, UNSPECIFIED HYPERLIPIDEMIA TYPE: ICD-10-CM

## 2022-02-02 DIAGNOSIS — K50.80 CROHN'S DISEASE OF BOTH SMALL AND LARGE INTESTINE WITHOUT COMPLICATION: Primary | ICD-10-CM

## 2022-02-02 DIAGNOSIS — E53.8 FOLIC ACID DEFICIENCY (NON ANEMIC): ICD-10-CM

## 2022-02-02 DIAGNOSIS — Z01.818 PRE-OP TESTING: Primary | ICD-10-CM

## 2022-02-02 DIAGNOSIS — E53.8 VITAMIN B 12 DEFICIENCY: ICD-10-CM

## 2022-02-02 DIAGNOSIS — E55.9 VITAMIN D DEFICIENCY: ICD-10-CM

## 2022-02-02 DIAGNOSIS — K50.914 CROHN'S DISEASE WITH ABSCESS, UNSPECIFIED GASTROINTESTINAL TRACT LOCATION: Primary | ICD-10-CM

## 2022-02-02 DIAGNOSIS — E53.8 VITAMIN B12 DEFICIENCY: ICD-10-CM

## 2022-02-02 DIAGNOSIS — K58.0 IRRITABLE BOWEL SYNDROME WITH DIARRHEA: ICD-10-CM

## 2022-02-02 DIAGNOSIS — K50.914 CROHN'S DISEASE WITH ABSCESS, UNSPECIFIED GASTROINTESTINAL TRACT LOCATION: ICD-10-CM

## 2022-02-02 LAB
25(OH)D3 SERPL-MCNC: 34.6 NG/ML
BASOPHILS # BLD AUTO: 0.05 10*3/MM3 (ref 0–0.2)
BASOPHILS NFR BLD AUTO: 1.4 % (ref 0–1.5)
CHOLEST SERPL-MCNC: 298 MG/DL (ref 0–200)
DEPRECATED RDW RBC AUTO: 61.9 FL (ref 37–54)
EOSINOPHIL # BLD AUTO: 0.02 10*3/MM3 (ref 0–0.4)
EOSINOPHIL NFR BLD AUTO: 0.6 % (ref 0.3–6.2)
ERYTHROCYTE [DISTWIDTH] IN BLOOD BY AUTOMATED COUNT: 14.7 % (ref 12.3–15.4)
FOLATE SERPL-MCNC: 16.2 NG/ML (ref 4.78–24.2)
HCT VFR BLD AUTO: 42.1 % (ref 34–46.6)
HDLC SERPL-MCNC: 104 MG/DL (ref 40–60)
HGB BLD-MCNC: 14 G/DL (ref 12–15.9)
LDLC SERPL CALC-MCNC: 182 MG/DL (ref 0–100)
LDLC/HDLC SERPL: 1.71 {RATIO}
LYMPHOCYTES # BLD AUTO: 1.42 10*3/MM3 (ref 0.7–3.1)
LYMPHOCYTES NFR BLD AUTO: 40.8 % (ref 19.6–45.3)
MCH RBC QN AUTO: 37.6 PG (ref 26.6–33)
MCHC RBC AUTO-ENTMCNC: 33.3 G/DL (ref 31.5–35.7)
MCV RBC AUTO: 113.2 FL (ref 79–97)
MONOCYTES # BLD AUTO: 0.34 10*3/MM3 (ref 0.1–0.9)
MONOCYTES NFR BLD AUTO: 9.8 % (ref 5–12)
NEUTROPHILS NFR BLD AUTO: 1.63 10*3/MM3 (ref 1.7–7)
NEUTROPHILS NFR BLD AUTO: 46.8 % (ref 42.7–76)
PLATELET # BLD AUTO: 218 10*3/MM3 (ref 140–450)
PMV BLD AUTO: 10.1 FL (ref 6–12)
RBC # BLD AUTO: 3.72 10*6/MM3 (ref 3.77–5.28)
TRIGL SERPL-MCNC: 79 MG/DL (ref 0–150)
VIT B12 BLD-MCNC: 676 PG/ML (ref 211–946)
VLDLC SERPL-MCNC: 12 MG/DL (ref 5–40)
WBC NRBC COR # BLD: 3.48 10*3/MM3 (ref 3.4–10.8)

## 2022-02-02 PROCEDURE — 80061 LIPID PANEL: CPT

## 2022-02-02 PROCEDURE — 99214 OFFICE O/P EST MOD 30 MIN: CPT | Performed by: INTERNAL MEDICINE

## 2022-02-02 PROCEDURE — 82746 ASSAY OF FOLIC ACID SERUM: CPT

## 2022-02-02 PROCEDURE — 82306 VITAMIN D 25 HYDROXY: CPT

## 2022-02-02 PROCEDURE — 85025 COMPLETE CBC W/AUTO DIFF WBC: CPT

## 2022-02-02 PROCEDURE — 82607 VITAMIN B-12: CPT

## 2022-02-02 PROCEDURE — 36415 COLL VENOUS BLD VENIPUNCTURE: CPT

## 2022-02-02 PROCEDURE — 80053 COMPREHEN METABOLIC PANEL: CPT

## 2022-02-02 RX ORDER — FOLIC ACID 1 MG/1
1 TABLET ORAL DAILY
Qty: 30 TABLET | Refills: 5 | Status: SHIPPED | OUTPATIENT
Start: 2022-02-02 | End: 2022-12-19

## 2022-02-02 RX ORDER — SODIUM CHLORIDE 9 MG/ML
70 INJECTION, SOLUTION INTRAVENOUS CONTINUOUS PRN
Status: CANCELLED | OUTPATIENT
Start: 2022-02-02

## 2022-02-02 RX ORDER — SODIUM, POTASSIUM,MAG SULFATES 17.5-3.13G
2 SOLUTION, RECONSTITUTED, ORAL ORAL ONCE
Qty: 354 ML | Refills: 0 | Status: SHIPPED | OUTPATIENT
Start: 2022-02-02 | End: 2022-02-02

## 2022-02-02 NOTE — PROGRESS NOTES
Follow Up Note     Date: 2022   Patient Name: Ashley Riley  MRN: [id number]  : 1985     Referring Physician: Irish Villatoro MD    Chief Complaint:    Chief Complaint   Patient presents with   • Follow-up   • Crohn's Disease       Interval History:   2022  Ashley Riley is a 37 y.o. female who is here today for follow up for her Crohn's disease.  She is still having issues with abdominal bloating.  He has been having bowel movement mostly 1 or 2/day however occasionally she will have loose stools 4-5 times per day.  Denies any blood in the stool.  He tried to lose weight and lost about 10 pounds since last visit.    2021  Ashley Riley is a 36 y.o. female who is here today for follow up for her Crohn's disease.  She states that she has been having a regular bowel movement until recently.  After her recent vacation she developed some loose stools anywhere from 3 to 5/day without any blood without any abdominal pain.  She had recent blood work and she is here to discuss the reports and follow-up..     2021  Ashley Riley is a 36 y.o. female who is here today to establish care with Gastroenterology for further evaluation management of her Crohn's disease and suspected nonalcoholic fatty liver disease.  She was diagnosed with Crohn's disease of the small and large intestine in  with a colonoscopy and biopsies In [place] at [facility] and .  She was followed at [facility] before.  She was on Cimzia for about 5 yrs and imuran for 3 yrs. She was put on stelara 2020. She is currently on Stelara 90 mg subcu inj.. She will have bowel movement 2-3 times per day, loose. No blood in stool. She had recent one episode of lower abdomen week ago that has resolved. She did have intraabdominal abscess in   And had a drainage. No bowel resection.      This patient deny any melena.  Weight is stable. Pt denies nausea vomiting or odynophagia or dysphagia. There  is history of acid reflux under well control with ppi. There is no history of anemia. No recent history of EGD. Last colonoscopy was 2019 at [place]. As per patient there was not much inflammation.   Family history of colon cancer- Grandfather had colon Cancer and father had stomach cancer. No history of any abdominal surgery. She will have wine couple of glass daily and she is a chronic smoker.     Subjective      Past Medical History:   Past Medical History:   Diagnosis Date   • Alcoholic liver damage (HCC)    • Anxiety    • Bipolar disorder (HCC)    • Bronchitis    • Crohn's disease (HCC)    • Depression    • Drug-induced neutropenia (HCC)    • H/O malignant neoplasm of thyroid    • Nasal lesion    • WEATHERS (nonalcoholic steatohepatitis)    • Olecranon bursitis    • Vitamin B12 deficiency      Past Surgical History:   Past Surgical History:   Procedure Laterality Date   • COLONOSCOPY     • ENDOSCOPY     • THYROIDECTOMY         Family History: History reviewed. No pertinent family history.    Social History:   Social History     Socioeconomic History   • Marital status: Single   Tobacco Use   • Smoking status: Current Every Day Smoker   • Smokeless tobacco: Never Used   Substance and Sexual Activity   • Alcohol use: Yes     Comment: DAILY ALCOHOL USE   • Drug use: Never   • Sexual activity: Defer       Medications:     Current Outpatient Medications:   •  busPIRone (BUSPAR) 10 MG tablet, Take 10 mg by mouth 3 (Three) Times a Day., Disp: , Rfl:   •  cholecalciferol (VITAMIN D3) 25 MCG (1000 UT) tablet, Take 1,000 Units by mouth Daily., Disp: , Rfl:   •  clotrimazole (MYCELEX) 10 MG amos, Take 10 mg by mouth 5 (Five) Times a Day., Disp: , Rfl:   •  Cyanocobalamin 1000 MCG/ML kit, Inject  as directed., Disp: , Rfl:   •  escitalopram (LEXAPRO) 20 MG tablet, Take 20 mg by mouth Daily., Disp: , Rfl:   •  fluocinolone (SYNALAR) 0.01 % external solution, Apply  topically to the appropriate area as directed 2 (Two) Times  "a Day., Disp: , Rfl:   •  folic acid (FOLVITE) 1 MG tablet, Take 1 tablet by mouth Daily., Disp: 30 tablet, Rfl: 5  •  ketoconazole (NIZORAL) 2 % cream, Apply  topically to the appropriate area as directed Daily., Disp: , Rfl:   •  ketoconazole (NIZORAL) 2 % shampoo, Apply  topically to the appropriate area as directed 2 (Two) Times a Week., Disp: , Rfl:   •  levothyroxine (SYNTHROID, LEVOTHROID) 175 MCG tablet, Take 175 mcg by mouth Daily., Disp: , Rfl:   •  mupirocin (BACTROBAN) 2 % ointment, Apply  topically to the appropriate area as directed 3 (Three) Times a Day., Disp: , Rfl:   •  pantoprazole (PROTONIX) 20 MG EC tablet, TAKE 2 TABLETS BY MOUTH DAILY, Disp: 30 tablet, Rfl: 5  •  propranolol (INDERAL) 40 MG tablet, Take 40 mg by mouth 3 (Three) Times a Day., Disp: , Rfl:   •  Ustekinumab (Stelara) 90 MG/ML solution prefilled syringe Injection, Inject 90 mg under the skin into the appropriate area as directed Every 2 (Two) Months., Disp: 1 mL, Rfl: 1    Allergies:   Allergies   Allergen Reactions   • Remicade [Infliximab] Anaphylaxis     Ask patient        Review of Systems:   Review of Systems    The following portions of the patient's history were reviewed and updated as appropriate: allergies, current medications, past family history, past medical history, past social history, past surgical history and problem list.    Objective     Physical Exam:  Vital Signs:   Vitals:    02/02/22 1547   BP: 124/84   Temp: 97.3 °F (36.3 °C)   TempSrc: Infrared   Weight: 78.9 kg (174 lb)   Height: 160 cm (63\")       Physical Exam  Constitutional:       Appearance: She is obese.   HENT:      Head: Normocephalic and atraumatic.   Eyes:      Conjunctiva/sclera: Conjunctivae normal.   Abdominal:      General: Abdomen is flat. There is no distension.      Palpations: There is no mass.      Tenderness: There is no abdominal tenderness. There is no guarding or rebound.      Hernia: No hernia is present.   Musculoskeletal:      " Cervical back: Normal range of motion and neck supple.   Neurological:      Mental Status: She is alert.         Results Review:   I reviewed the patient's new clinical results.    No visits with results within 90 Day(s) from this visit.   Latest known visit with results is:   Lab on 05/04/2021   Component Date Value Ref Range Status   • Glucose 05/04/2021 92  65 - 99 mg/dL Final   • BUN 05/04/2021 12  6 - 20 mg/dL Final   • Creatinine 05/04/2021 0.84  0.57 - 1.00 mg/dL Final   • Sodium 05/04/2021 139  136 - 145 mmol/L Final   • Potassium 05/04/2021 4.0  3.5 - 5.2 mmol/L Final   • Chloride 05/04/2021 102  98 - 107 mmol/L Final   • CO2 05/04/2021 24.5  22.0 - 29.0 mmol/L Final   • Calcium 05/04/2021 9.1  8.6 - 10.5 mg/dL Final   • Total Protein 05/04/2021 7.5  6.0 - 8.5 g/dL Final   • Albumin 05/04/2021 4.50  3.50 - 5.20 g/dL Final   • ALT (SGPT) 05/04/2021 107* 1 - 33 U/L Final   • AST (SGOT) 05/04/2021 92* 1 - 32 U/L Final   • Alkaline Phosphatase 05/04/2021 76  39 - 117 U/L Final   • Total Bilirubin 05/04/2021 0.5  0.0 - 1.2 mg/dL Final   • eGFR Non African Amer 05/04/2021 76  >60 mL/min/1.73 Final   • Globulin 05/04/2021 3.0  gm/dL Final   • A/G Ratio 05/04/2021 1.5  g/dL Final   • BUN/Creatinine Ratio 05/04/2021 14.3  7.0 - 25.0 Final   • Anion Gap 05/04/2021 12.5  5.0 - 15.0 mmol/L Final   • WBC 05/04/2021 2.48* 3.40 - 10.80 10*3/mm3 Final   • RBC 05/04/2021 3.35* 3.77 - 5.28 10*6/mm3 Final   • Hemoglobin 05/04/2021 12.7  12.0 - 15.9 g/dL Final   • Hematocrit 05/04/2021 36.9  34.0 - 46.6 % Final   • MCV 05/04/2021 110.1* 79.0 - 97.0 fL Final   • MCH 05/04/2021 37.9* 26.6 - 33.0 pg Final   • MCHC 05/04/2021 34.4  31.5 - 35.7 g/dL Final   • RDW 05/04/2021 16.1* 12.3 - 15.4 % Final   • RDW-SD 05/04/2021 64.8* 37.0 - 54.0 fl Final   • MPV 05/04/2021 10.6  6.0 - 12.0 fL Final   • Platelets 05/04/2021 213  140 - 450 10*3/mm3 Final   • Vitamin B-12 05/04/2021 529  211 - 946 pg/mL Final   • Folate 05/04/2021 <2.00*  4.78 - 24.20 ng/mL Final   • Hep A Total Ab 05/04/2021 Negative  Negative Final   • Hep B Core Total Ab 05/04/2021 Negative  Negative Final   • Hep B S Ab 05/04/2021 Reactive* Non-Reactive Final   • Hepatitis B Surface Ag 05/04/2021 Non-Reactive  Non-Reactive Final   • Ceruloplasmin 05/04/2021 32  19 - 39 mg/dL Final   • JUAN Direct 05/04/2021 Negative  Negative Final   • Smooth Muscle Ab 05/04/2021 12  0 - 19 Units Final                     Negative                     0 - 19                   Weak positive               20 - 30                   Moderate to strong positive     >30   Actin Antibodies are found in 52-85% of patients with   autoimmune hepatitis or chronic active hepatitis and   in 22% of patients with primary biliary cirrhosis.   • Mitochondrial Ab 05/04/2021 <20.0  0.0 - 20.0 Units Final                                    Negative    0.0 - 20.0                                  Equivocal  20.1 - 24.9                                  Positive         >24.9  Mitochondrial (M2) Antibodies are found in 90-96% of  patients with primary biliary cirrhosis.   • Hepatitis C Ab 05/04/2021 Non-Reactive  Non-Reactive Final   • Ferritin 05/04/2021 192.00* 13.00 - 150.00 ng/mL Final   • Iron 05/04/2021 77  37 - 145 mcg/dL Final   • Iron Saturation 05/04/2021 21  20 - 50 % Final   • Transferrin 05/04/2021 242  200 - 360 mg/dL Final   • TIBC 05/04/2021 361  298 - 536 mcg/dL Final   • A-1 Antitrypsin 05/04/2021 125  100 - 188 mg/dL Final   • Phenotype 05/04/2021 MM   Final           Phenotype  Population     A-1-AT Concentration*                    Incidence %   % of MM (Typical Range)         MM            86.5%       100%     (96 - 189)         MS             8.0%        86%     (83 - 161)         MZ             3.9%        61%     (60 - 111)         FM             0.4%       100%     (93 - 191)         SZ             0.3%        41%     (42 -  75)         SS             0.1%        64%     (62 - 119)         ZZ              0.05%       19%     (16 -  38)         FS             0.05%       70%     (70 - 128)         FZ            Unknown      46%     (44 -  88)         FF            Unknown                Unknown  *A-1-AT concentration in the homozygous MM phenotype   is taken as the reference normal. Percent deficiency   in each phenotype is reported relative to this   reference. Ranges used to confirm phenotype.   • Neutrophil % 05/04/2021 44.0  42.7 - 76.0 % Final   • Lymphocyte % 05/04/2021 46.0* 19.6 - 45.3 % Final   • Monocyte % 05/04/2021 7.0  5.0 - 12.0 % Final   • Eosinophil % 05/04/2021 1.0  0.3 - 6.2 % Final   • Basophil % 05/04/2021 2.0* 0.0 - 1.5 % Final   • Neutrophils Absolute 05/04/2021 1.09* 1.70 - 7.00 10*3/mm3 Final   • Lymphocytes Absolute 05/04/2021 1.14  0.70 - 3.10 10*3/mm3 Final   • Monocytes Absolute 05/04/2021 0.17  0.10 - 0.90 10*3/mm3 Final   • Eosinophils Absolute 05/04/2021 0.02  0.00 - 0.40 10*3/mm3 Final   • Basophils Absolute 05/04/2021 0.05  0.00 - 0.20 10*3/mm3 Final   • Anisocytosis 05/04/2021 Slight/1+  None Seen Final   • Dacrocytes 05/04/2021 Slight/1+  None Seen Final   • Macrocytes 05/04/2021 Mod/2+  None Seen Final   • Polychromasia 05/04/2021 Slight/1+  None Seen Final   • WBC Morphology 05/04/2021 Normal  Normal Final   • Platelet Morphology 05/04/2021 Normal  Normal Final   • Calprotectin, Fecal 05/05/2021 42  0 - 120 ug/g Final    Concentration     Interpretation   Follow-Up  <16 - 50 ug/g     Normal           None  >50 -120 ug/g     Borderline       Re-evaluate in 4-6 weeks      >120 ug/g     Abnormal         Repeat as clinically                                     indicated      No radiology results for the last 90 days.    Assessment / Plan      1. Crohn's disease of both small and large intestine without complication (CMS/HCC)  2.  Suspected mild irritable bowel syndrome  3.  Low folate level and vitamin B12 deficiency  2/2/2022  She still has abdominal bloating and occasional  loose stools.  Her recent stool calprotectin was normal.  We will schedule her for surveillance colonoscopy.  Continue folic acid, vitamin B12 level.  She was supposed to get labs done today and will review when once report available.  We will also get Stelara levels with antibodies after the colonoscopy.    The indications, technique, alternatives and potential risk and complications were discussed with the patient including but not limited to bleeding, bowel perforations, missing lesions and anesthetic complications. The patient understands and wishes to proceed with the procedure and has given their verbal consent. Written patient education information was given to the patient.   The patient will call if they have further questions before procedure.       8/4/2021  [facility] elast few weeks she will have loose stool 3-5 per day. Otherwise she was having solid stool.   Her stool calprotectin is 42 within normal limits.  Recent CBC, CMP reviewed unremarkable except elevated liver enzymes and borderline low WBCs 2.4.  Platelets were normal hemoglobin was normal.  Her folate level was low less than two.  But her vitamin B12 was normal.  Her history is not suggestive of IBD flare.  We will start her on half a pill of Imodium as needed. We will start her on folic acid 1 mg p.o. daily    5/4/2021  Patient was diagnosed with ileocolonic Crohn's disease in 2014 at [facility].  She was initially on a Cimzia and Imuran.  It is unclear why the Cimzia was changed to Stelara.  Patient could not give a clear answer on that.  She has been taking Stelara since January 2020.  She did have a prior history of Crohn's abscess in 2016 and had a drainage but no bowel resections.     She is clinically appears to be doing well with the Stelara now.  Her last dose was 2 weeks ago.  Last fecal calprotectin in October 2020 was normal. Her history is suggestive of mild irritable bowel along with her IBD.  Her last  colonoscopy was in 2019 as per patient and was been told that no significant inflammation was noted the colonoscopy.  No full report available to interpret. We will continue her self Stelara 90 mg subcu q. 8 weeks  Colonoscopy in 2022    3. Gastroesophageal reflux disease without esophagitis  Advised to cut down or stop smoking and alcohol use.  We will change her Protonix 20 mg to on-demand only     4.  Nonalcoholic fatty liver disease  Mckeon fibrosure; F0/N0/S3  2/2/2022  Lost about 10 pounds.,  Would expect some improvement in her liver enzymes.  Advised to cut down or stop smoking and alcohol.  Advised to try to lose 10 more pounds next 6 months time.    8/4/2021  She is not immune to hepatitis B but immune to hepatitis A. hep C antibody is negative.  JUAN, anti-smooth muscle antibody, antimitochondrial antibodies negative ruling out autoimmune otitis and PBC.  Her cellular plasma level was normal alpha-1 antitrypsin level and phenotype was normal.  Iron saturation within normal limits ruling out hemochromatosis.  This is most likely nonalcoholic fatty liver disease.. Some element of medication causing this elevation including Lexapro cannot be ruled out.     She states that she got combine hep a and B vaccibe, booster dose is pending  Advised to cut down the alcohol use and cut down or stop smoking  Also discussed on losing weight at least 15 pounds over the next 6 months time.     5/4/2021  Hepatocellular pattern of elevated liver enzymes with ALT more than 4-5 times upper limit of normal.  This is concerning for autoimmune hepatitis.   Her Mckeon fibrosure done by PCP revealed S3 steatosis without any fibrosis or steatohepatitis    Prior history  5. Encounter for screening for malignant neoplasm of colon  6.  Family history of colon cancer  Last colonoscopy was 2019 no polyps removed.  Her grandfather had a colon cancer she is due for surveillance colonoscopy next year in 2022  7. Class 1 obesity due to excess  calories without serious comorbidity with body mass index (BMI) of 32.0 to 32.9 in adult      Follow Up:   No follow-ups on file.    Abhay Rascon MD  [facility] [place]  2/2/2022  15:48 EST     Please note that portions of this note may have been completed with a voice recognition program.

## 2022-02-03 LAB
ALBUMIN SERPL-MCNC: 4.7 G/DL (ref 3.5–5.2)
ALBUMIN/GLOB SERPL: 1.7 G/DL
ALP SERPL-CCNC: 62 U/L (ref 39–117)
ALT SERPL W P-5'-P-CCNC: 68 U/L (ref 1–33)
ANION GAP SERPL CALCULATED.3IONS-SCNC: 13.5 MMOL/L (ref 5–15)
AST SERPL-CCNC: 78 U/L (ref 1–32)
BILIRUB SERPL-MCNC: 0.8 MG/DL (ref 0–1.2)
BUN SERPL-MCNC: 10 MG/DL (ref 6–20)
BUN/CREAT SERPL: 9.7 (ref 7–25)
CALCIUM SPEC-SCNC: 9 MG/DL (ref 8.6–10.5)
CHLORIDE SERPL-SCNC: 96 MMOL/L (ref 98–107)
CO2 SERPL-SCNC: 21.5 MMOL/L (ref 22–29)
CREAT SERPL-MCNC: 1.03 MG/DL (ref 0.57–1)
GFR SERPL CREATININE-BSD FRML MDRD: 60 ML/MIN/1.73
GLOBULIN UR ELPH-MCNC: 2.7 GM/DL
GLUCOSE SERPL-MCNC: 88 MG/DL (ref 65–99)
POTASSIUM SERPL-SCNC: 4 MMOL/L (ref 3.5–5.2)
PROT SERPL-MCNC: 7.4 G/DL (ref 6–8.5)
SODIUM SERPL-SCNC: 131 MMOL/L (ref 136–145)

## 2022-02-08 ENCOUNTER — LAB (OUTPATIENT)
Dept: LAB | Facility: HOSPITAL | Age: 37
End: 2022-02-08

## 2022-02-08 DIAGNOSIS — K58.0 IRRITABLE BOWEL SYNDROME WITH DIARRHEA: ICD-10-CM

## 2022-02-08 DIAGNOSIS — E53.8 VITAMIN B12 DEFICIENCY: ICD-10-CM

## 2022-02-08 DIAGNOSIS — K50.80 CROHN'S DISEASE OF BOTH SMALL AND LARGE INTESTINE WITHOUT COMPLICATION: ICD-10-CM

## 2022-02-08 DIAGNOSIS — K21.9 GASTROESOPHAGEAL REFLUX DISEASE WITHOUT ESOPHAGITIS: ICD-10-CM

## 2022-02-08 LAB
APTT PPP: 40.1 SECONDS (ref 24.5–37.2)
INR PPP: 0.91 (ref 0.9–1.1)
PROTHROMBIN TIME: 12.8 SECONDS (ref 12–15.1)
SARS-COV-2 RNA PNL SPEC NAA+PROBE: NOT DETECTED
T4 FREE SERPL-MCNC: 0.46 NG/DL (ref 0.93–1.7)
TSH SERPL DL<=0.05 MIU/L-ACNC: 83.2 UIU/ML (ref 0.27–4.2)

## 2022-02-08 PROCEDURE — 36415 COLL VENOUS BLD VENIPUNCTURE: CPT

## 2022-02-08 PROCEDURE — U0004 COV-19 TEST NON-CDC HGH THRU: HCPCS

## 2022-02-08 PROCEDURE — C9803 HOPD COVID-19 SPEC COLLECT: HCPCS

## 2022-02-08 PROCEDURE — 85610 PROTHROMBIN TIME: CPT

## 2022-02-08 PROCEDURE — 84443 ASSAY THYROID STIM HORMONE: CPT

## 2022-02-08 PROCEDURE — 85730 THROMBOPLASTIN TIME PARTIAL: CPT

## 2022-02-08 PROCEDURE — 84439 ASSAY OF FREE THYROXINE: CPT

## 2022-02-10 ENCOUNTER — TELEPHONE (OUTPATIENT)
Dept: GASTROENTEROLOGY | Facility: CLINIC | Age: 37
End: 2022-02-10

## 2022-02-10 NOTE — TELEPHONE ENCOUNTER
Called the patient she has a Synthroid 175 mcg pills but she was not taking.   We will cancel the 50 mcg prescription sent.   Advised to start taking the pills what she has and to repeat the TSH level in 3 months time

## 2022-02-11 DIAGNOSIS — R79.89 ELEVATED TSH: Primary | ICD-10-CM

## 2022-02-14 RX ORDER — USTEKINUMAB 90 MG/ML
INJECTION, SOLUTION SUBCUTANEOUS
Qty: 1 EACH | Refills: 5 | Status: SHIPPED | OUTPATIENT
Start: 2022-02-14 | End: 2023-03-16

## 2022-03-01 ENCOUNTER — LAB (OUTPATIENT)
Dept: LAB | Facility: HOSPITAL | Age: 37
End: 2022-03-01

## 2022-03-01 ENCOUNTER — HOSPITAL ENCOUNTER (EMERGENCY)
Facility: HOSPITAL | Age: 37
Discharge: HOME | End: 2022-03-01
Attending: EMERGENCY MEDICINE | Admitting: EMERGENCY MEDICINE

## 2022-03-01 ENCOUNTER — APPOINTMENT (OUTPATIENT)
Dept: GENERAL RADIOLOGY | Facility: HOSPITAL | Age: 37
End: 2022-03-01

## 2022-03-01 VITALS
HEART RATE: 63 BPM | DIASTOLIC BLOOD PRESSURE: 96 MMHG | WEIGHT: 170 LBS | BODY MASS INDEX: 30.12 KG/M2 | TEMPERATURE: 99 F | HEIGHT: 63 IN | SYSTOLIC BLOOD PRESSURE: 144 MMHG | OXYGEN SATURATION: 99 % | RESPIRATION RATE: 16 BRPM

## 2022-03-01 DIAGNOSIS — R79.89 ELEVATED TSH: ICD-10-CM

## 2022-03-01 DIAGNOSIS — Z86.39 HISTORY OF HYPOTHYROIDISM: ICD-10-CM

## 2022-03-01 DIAGNOSIS — K50.80 CROHN'S DISEASE OF BOTH SMALL AND LARGE INTESTINE WITHOUT COMPLICATION: ICD-10-CM

## 2022-03-01 DIAGNOSIS — R45.89 FEELING ANXIOUS: Primary | ICD-10-CM

## 2022-03-01 LAB
ALBUMIN SERPL-MCNC: 4.4 G/DL (ref 3.5–5.2)
ALBUMIN/GLOB SERPL: 1.5 G/DL
ALP SERPL-CCNC: 61 U/L (ref 39–117)
ALT SERPL W P-5'-P-CCNC: 45 U/L (ref 1–33)
AMPHET+METHAMPHET UR QL: NEGATIVE
AMPHETAMINES UR QL: NEGATIVE
ANION GAP SERPL CALCULATED.3IONS-SCNC: 13.5 MMOL/L (ref 5–15)
ANISOCYTOSIS BLD QL: NORMAL
AST SERPL-CCNC: 43 U/L (ref 1–32)
B-HCG UR QL: NEGATIVE
BACTERIA UR QL AUTO: ABNORMAL /HPF
BARBITURATES UR QL SCN: NEGATIVE
BASOPHILS # BLD AUTO: 0.04 10*3/MM3 (ref 0–0.2)
BASOPHILS NFR BLD AUTO: 1.3 % (ref 0–1.5)
BENZODIAZ UR QL SCN: NEGATIVE
BILIRUB SERPL-MCNC: 0.7 MG/DL (ref 0–1.2)
BILIRUB UR QL STRIP: NEGATIVE
BUN SERPL-MCNC: 11 MG/DL (ref 6–20)
BUN/CREAT SERPL: 13.1 (ref 7–25)
BUPRENORPHINE SERPL-MCNC: NEGATIVE NG/ML
CALCIUM SPEC-SCNC: 9.2 MG/DL (ref 8.6–10.5)
CANNABINOIDS SERPL QL: POSITIVE
CHLORIDE SERPL-SCNC: 101 MMOL/L (ref 98–107)
CLARITY UR: CLEAR
CO2 SERPL-SCNC: 21.5 MMOL/L (ref 22–29)
COCAINE UR QL: NEGATIVE
COLOR UR: YELLOW
CREAT SERPL-MCNC: 0.84 MG/DL (ref 0.57–1)
DEPRECATED RDW RBC AUTO: 57 FL (ref 37–54)
EGFRCR SERPLBLD CKD-EPI 2021: 90.8 ML/MIN/1.73
EOSINOPHIL # BLD AUTO: 0.01 10*3/MM3 (ref 0–0.4)
EOSINOPHIL NFR BLD AUTO: 0.3 % (ref 0.3–6.2)
ERYTHROCYTE [DISTWIDTH] IN BLOOD BY AUTOMATED COUNT: 14.1 % (ref 12.3–15.4)
GLOBULIN UR ELPH-MCNC: 3 GM/DL
GLUCOSE SERPL-MCNC: 84 MG/DL (ref 65–99)
GLUCOSE UR STRIP-MCNC: NEGATIVE MG/DL
HCT VFR BLD AUTO: 35.8 % (ref 34–46.6)
HGB BLD-MCNC: 12.5 G/DL (ref 12–15.9)
HGB UR QL STRIP.AUTO: ABNORMAL
HYALINE CASTS UR QL AUTO: ABNORMAL /LPF
IMM GRANULOCYTES # BLD AUTO: 0.01 10*3/MM3 (ref 0–0.05)
IMM GRANULOCYTES NFR BLD AUTO: 0.3 % (ref 0–0.5)
KETONES UR QL STRIP: ABNORMAL
LEUKOCYTE ESTERASE UR QL STRIP.AUTO: NEGATIVE
LYMPHOCYTES # BLD AUTO: 1.28 10*3/MM3 (ref 0.7–3.1)
LYMPHOCYTES NFR BLD AUTO: 40.4 % (ref 19.6–45.3)
MACROCYTES BLD QL SMEAR: NORMAL
MAGNESIUM SERPL-MCNC: 1.6 MG/DL (ref 1.6–2.6)
MCH RBC QN AUTO: 38 PG (ref 26.6–33)
MCHC RBC AUTO-ENTMCNC: 34.9 G/DL (ref 31.5–35.7)
MCV RBC AUTO: 108.8 FL (ref 79–97)
METHADONE UR QL SCN: NEGATIVE
MONOCYTES # BLD AUTO: 0.52 10*3/MM3 (ref 0.1–0.9)
MONOCYTES NFR BLD AUTO: 16.4 % (ref 5–12)
NEUTROPHILS NFR BLD AUTO: 1.31 10*3/MM3 (ref 1.7–7)
NEUTROPHILS NFR BLD AUTO: 41.3 % (ref 42.7–76)
NITRITE UR QL STRIP: NEGATIVE
NRBC BLD AUTO-RTO: 0 /100 WBC (ref 0–0.2)
NT-PROBNP SERPL-MCNC: 29.1 PG/ML (ref 0–450)
OPIATES UR QL: NEGATIVE
OXYCODONE UR QL SCN: NEGATIVE
PCP UR QL SCN: NEGATIVE
PH UR STRIP.AUTO: 7 [PH] (ref 5–8)
PLATELET # BLD AUTO: 208 10*3/MM3 (ref 140–450)
PMV BLD AUTO: 9.2 FL (ref 6–12)
POTASSIUM SERPL-SCNC: 3.8 MMOL/L (ref 3.5–5.2)
PROPOXYPH UR QL: NEGATIVE
PROT SERPL-MCNC: 7.4 G/DL (ref 6–8.5)
PROT UR QL STRIP: NEGATIVE
RBC # BLD AUTO: 3.29 10*6/MM3 (ref 3.77–5.28)
RBC # UR STRIP: ABNORMAL /HPF
REF LAB TEST METHOD: ABNORMAL
SMALL PLATELETS BLD QL SMEAR: ADEQUATE
SODIUM SERPL-SCNC: 136 MMOL/L (ref 136–145)
SP GR UR STRIP: 1.02 (ref 1–1.03)
SQUAMOUS #/AREA URNS HPF: ABNORMAL /HPF
T4 FREE SERPL-MCNC: 1.29 NG/DL (ref 0.93–1.7)
TRICYCLICS UR QL SCN: NEGATIVE
TROPONIN T SERPL-MCNC: <0.01 NG/ML (ref 0–0.03)
TSH SERPL DL<=0.05 MIU/L-ACNC: 4.51 UIU/ML (ref 0.27–4.2)
UROBILINOGEN UR QL STRIP: ABNORMAL
WBC # UR STRIP: ABNORMAL /HPF
WBC MORPH BLD: NORMAL
WBC NRBC COR # BLD: 3.17 10*3/MM3 (ref 3.4–10.8)

## 2022-03-01 PROCEDURE — 84439 ASSAY OF FREE THYROXINE: CPT | Performed by: PHYSICIAN ASSISTANT

## 2022-03-01 PROCEDURE — 93005 ELECTROCARDIOGRAM TRACING: CPT | Performed by: PHYSICIAN ASSISTANT

## 2022-03-01 PROCEDURE — 96374 THER/PROPH/DIAG INJ IV PUSH: CPT

## 2022-03-01 PROCEDURE — 83735 ASSAY OF MAGNESIUM: CPT | Performed by: PHYSICIAN ASSISTANT

## 2022-03-01 PROCEDURE — 84484 ASSAY OF TROPONIN QUANT: CPT | Performed by: PHYSICIAN ASSISTANT

## 2022-03-01 PROCEDURE — 99283 EMERGENCY DEPT VISIT LOW MDM: CPT

## 2022-03-01 PROCEDURE — 80306 DRUG TEST PRSMV INSTRMNT: CPT | Performed by: PHYSICIAN ASSISTANT

## 2022-03-01 PROCEDURE — 80050 GENERAL HEALTH PANEL: CPT | Performed by: PHYSICIAN ASSISTANT

## 2022-03-01 PROCEDURE — 83880 ASSAY OF NATRIURETIC PEPTIDE: CPT | Performed by: PHYSICIAN ASSISTANT

## 2022-03-01 PROCEDURE — 81001 URINALYSIS AUTO W/SCOPE: CPT | Performed by: PHYSICIAN ASSISTANT

## 2022-03-01 PROCEDURE — 25010000002 LORAZEPAM PER 2 MG: Performed by: EMERGENCY MEDICINE

## 2022-03-01 PROCEDURE — 85007 BL SMEAR W/DIFF WBC COUNT: CPT | Performed by: PHYSICIAN ASSISTANT

## 2022-03-01 PROCEDURE — 36415 COLL VENOUS BLD VENIPUNCTURE: CPT

## 2022-03-01 PROCEDURE — 71045 X-RAY EXAM CHEST 1 VIEW: CPT

## 2022-03-01 PROCEDURE — 81025 URINE PREGNANCY TEST: CPT | Performed by: PHYSICIAN ASSISTANT

## 2022-03-01 RX ORDER — LORAZEPAM 2 MG/ML
0.5 INJECTION INTRAMUSCULAR ONCE
Status: COMPLETED | OUTPATIENT
Start: 2022-03-01 | End: 2022-03-01

## 2022-03-01 RX ORDER — SODIUM CHLORIDE 0.9 % (FLUSH) 0.9 %
10 SYRINGE (ML) INJECTION AS NEEDED
Status: DISCONTINUED | OUTPATIENT
Start: 2022-03-01 | End: 2022-03-01 | Stop reason: HOSPADM

## 2022-03-01 RX ADMIN — LORAZEPAM 0.5 MG: 2 INJECTION INTRAMUSCULAR at 17:13

## 2022-03-01 NOTE — ED PROVIDER NOTES
Subjective   Patient is a 37-year-old female with a history of alcoholic liver damage, anxiety, bipolar disorder, Crohn's disease, drug-induced neutropenia, thyroid cancer status post thyroidectomy, and vitamin B12 deficiency presenting to the ER for evaluation of anxious feelings and concern for medication reaction.  Patient states that today while she was at work she felt as if she was just about to jump out of her skin.  She states she felt like she was having multiple panic attacks, states she did feel a bit short of breath at that time.  She states she is concerned she may be taking too much thyroid medication.  She states that she has not been taking her Synthroid the way she was supposed to recently.  States she had her levels checked with her physician approximately 3 weeks ago and is try to be better at taking this medicine but now is afraid that her thyroid levels may be too high.  She states that she does take anxiety medicine as needed.  Denies any recent fever, chills, headache, headache, vision loss or changes, chest pain, abdominal pain, nausea, emesis, diarrhea, or any other symptoms.          Review of Systems   Constitutional: Negative for chills and fever.   HENT: Negative.    Eyes: Negative.    Respiratory: Negative.    Cardiovascular: Negative.    Gastrointestinal: Negative.    Genitourinary: Negative.    Musculoskeletal: Negative.    Skin: Negative.    Allergic/Immunologic: Negative for immunocompromised state.   Neurological: Negative.    Psychiatric/Behavioral: Negative for agitation, confusion and hallucinations. The patient is nervous/anxious.        Past Medical History:   Diagnosis Date   • Alcoholic liver damage (HCC)    • Anxiety    • Bipolar disorder (HCC)    • Bronchitis    • Crohn's disease (HCC)    • Depression    • Drug-induced neutropenia (HCC)    • H/O malignant neoplasm of thyroid    • Nasal lesion    • WEATHERS (nonalcoholic steatohepatitis)    • Olecranon bursitis    • Vitamin B12  "deficiency        Allergies   Allergen Reactions   • Remicade [Infliximab] Anaphylaxis     Ask patient        Past Surgical History:   Procedure Laterality Date   • COLONOSCOPY     • ENDOSCOPY     • THYROIDECTOMY         History reviewed. No pertinent family history.    Social History     Socioeconomic History   • Marital status: Single   Tobacco Use   • Smoking status: Current Every Day Smoker   • Smokeless tobacco: Never Used   Substance and Sexual Activity   • Alcohol use: Yes     Comment: DAILY ALCOHOL USE   • Drug use: Never   • Sexual activity: Defer           Objective   Physical Exam  Vitals and nursing note reviewed.     /96   Pulse 63   Temp 99 °F (37.2 °C) (Oral)   Resp 16   Ht 160 cm (63\")   Wt 77.1 kg (170 lb)   LMP 02/08/2022 (Approximate)   SpO2 99%   BMI 30.11 kg/m²     GEN: No acute distress, sitting upright in stretcher.  Awake and alert.  Does not appear septic or toxic.  She appears extremely anxious, shaking her legs in the bed.  Head: Normocephalic, atraumatic  Eyes: EOM intact, PERRL  ENT:  mask in place per protocol  Chest: Nontender to palpation  Cardiovascular: Regular rate and rhythm  Lungs: Clear to auscultation bilaterally without adventitious sounds  Abdomen: Soft, nontender, nondistended, no peritoneal signs, no guarding  Extremities: No edema, normal appearance forage motion deficits, no obvious edema  Neuro: GCS 15  Psych: Mood and affect are appropriate      Procedures           ED Course  ED Course as of 03/01/22 2008   Tue Mar 01, 2022   1657 Color, UA: Yellow [LA]   1657 Appearance, UA: Clear [LA]   1657 pH, UA: 7.0 [LA]   1657 Specific Gravity, UA: 1.018 [LA]   1657 Glucose: Negative [LA]   1657 Ketones, UA(!): 40 mg/dL (2+) [LA]   1657 Bilirubin, UA: Negative [LA]   1657 Blood, UA(!): Trace [LA]   1657 Protein, UA: Negative [LA]   1657 Leukocytes, UA: Negative [LA]   1657 Nitrite, UA: Negative [LA]   1657 Urobilinogen, UA: 0.2 E.U./dL [LA]   1657 HCG, Urine QL: " Negative [LA]   1711 RBC, UA(!): 0-2 [LA]   1711 WBC, UA: None Seen [LA]   1711 Bacteria, UA(!): Trace [LA]   1711 Squamous Epithelial Cells, UA(!): 3-6 [LA]   1711 Hyaline Casts, UA: None Seen [LA]   1711 Methodology:: Manual Light Microscopy [LA]   1711 THC Screen, Urine(!): Positive [LA]   1711 Phencyclidine (PCP), Urine: Negative [LA]   1711 Cocaine Screen, Urine: Negative [LA]   1711 Methamphetamine, Ur: Negative [LA]   1711 Opiate Screen: Negative [LA]   1711 Amphetamine, Urine Qual: Negative [LA]   1711 Benzodiazepine Screen, Urine: Negative [LA]   1711 Tricyclic Antidepressants Screen: Negative [LA]   1711 Methadone Screen , Urine: Negative [LA]   1711 Barbiturates Screen, Urine: Negative [LA]   1711 Oxycodone Screen, Urine: Negative [LA]   1711 Propoxyphene Screen: Negative [LA]   1711 Buprenorphine, Screen, Urine: Negative [LA]   1717 Glucose: 84 [LA]   1724 EKG interpreted by me: Sinus rhythm, normal rate, prolonged QT, some nonspecific T waves, no acute ST elevations, this is an abnormal EKG [MP]   1749 BUN: 11 [LA]   1749 Creatinine: 0.84 [LA]   1749 Sodium: 136 [LA]   1749 Potassium: 3.8 [LA]   1749 Chloride: 101 [LA]   1749 CO2(!): 21.5 [LA]   1749 Calcium: 9.2 [LA]   1750 Total Protein: 7.4 [LA]   1750 Albumin: 4.40 [LA]   1750 ALT (SGPT)(!): 45 [LA]   1750 AST (SGOT)(!): 43 [LA]   1750 Alkaline Phosphatase: 61 [LA]   1750 Total Bilirubin: 0.7 [LA]   1750 Globulin: 3.0 [LA]   1750 A/G Ratio: 1.5 [LA]   1750 BUN/Creatinine Ratio: 13.1 [LA]   1750 Anion Gap: 13.5 [LA]   1750 Magnesium: 1.6 [LA]   1750 WBC(!): 3.17 [LA]   1750 Hemoglobin: 12.5 [LA]   1750 MCV(!): 108.8 [LA]   1750 MCH(!): 38.0 [LA]   1750 MCHC: 34.9 [LA]   1750 RDW: 14.1 [LA]   1750 RDW-SD(!): 57.0 [LA]   1750 MPV: 9.2 [LA]   1750 Platelets: 208 [LA]   1750 TSH Baseline(!): 4.510 [LA]   1750 Troponin T: <0.010 [LA]   1750 Free T4: 1.29 [LA]   1750 proBNP: 29.1 [LA]   1813 Discussed all findings with the patient.  She appears more calm  at this time.  She will follow-up with her primary care provider.  Discussed very strict return precautions. [LA]      ED Course User Index  [LA] Patrica Shelley PA-C  [MP] Lorenzo Pedroza MD                                                 MDM  Number of Diagnoses or Management Options  Feeling anxious  History of hypothyroidism  Diagnosis management comments: On arrival, patient was initially hypertensive but this had improved on reassessment.  She was in no acute distress, she appears to be very anxious.  Will obtain basic labs including magnesium, troponin, proBNP, chest x-ray, EKG, urinalysis and UPT.  Will also obtain TSH and free T4. We will give some Ativan to help with anxiety.    EKG interpreted by the attending.  I reviewed chest x-ray with attending, did not see any acute cardiopulmonary abnormality.  Labs are all stable.  Her TSH level was actually improved in comparison to previous as well as her free T4.  No other significant lab abnormalities, patient appeared much more calm.  Discussed with Dr. Pedroza, believe she may discharge at this time with very close follow-up with her primary care provider.  Discuss strict return precautions.  She verbalized understanding and was in agreement with this plan of care.       Amount and/or Complexity of Data Reviewed  Clinical lab tests: reviewed and ordered  Tests in the radiology section of CPT®: ordered and reviewed  Discussion of test results with the performing providers: yes  Decide to obtain previous medical records or to obtain history from someone other than the patient: yes  Review and summarize past medical records: yes  Discuss the patient with other providers: yes    Risk of Complications, Morbidity, and/or Mortality  Presenting problems: moderate  Diagnostic procedures: moderate  Management options: low    Patient Progress  Patient progress: stable      Final diagnoses:   Feeling anxious   History of hypothyroidism       ED Disposition  ED  Disposition     ED Disposition Condition Comment    Discharge Stable           Irish Villatoro MD  [address] [address]  [phone]    Schedule an appointment as soon as possible for a visit            Medication List      No changes were made to your prescriptions during this visit.          Patrica Shelley PA-C  03/01/22 2008

## 2022-03-01 NOTE — DISCHARGE INSTRUCTIONS
Take home medications as directed.  Try to follow-up with your primary care provider as early as possible to reevaluate symptoms.  Return to the ER for any change or worsening of symptoms, or any additional concerns.

## 2022-04-11 ENCOUNTER — ANESTHESIA (OUTPATIENT)
Dept: GASTROENTEROLOGY | Facility: HOSPITAL | Age: 37
End: 2022-04-11

## 2022-04-11 ENCOUNTER — ANESTHESIA EVENT (OUTPATIENT)
Dept: GASTROENTEROLOGY | Facility: HOSPITAL | Age: 37
End: 2022-04-11

## 2022-04-11 ENCOUNTER — HOSPITAL ENCOUNTER (OUTPATIENT)
Facility: HOSPITAL | Age: 37
Setting detail: HOSPITAL OUTPATIENT SURGERY
Discharge: HOME | End: 2022-04-11
Attending: INTERNAL MEDICINE | Admitting: INTERNAL MEDICINE

## 2022-04-11 VITALS
HEART RATE: 54 BPM | SYSTOLIC BLOOD PRESSURE: 109 MMHG | RESPIRATION RATE: 18 BRPM | DIASTOLIC BLOOD PRESSURE: 71 MMHG | HEIGHT: 63 IN | BODY MASS INDEX: 30.12 KG/M2 | OXYGEN SATURATION: 100 % | WEIGHT: 170 LBS | TEMPERATURE: 97 F

## 2022-04-11 DIAGNOSIS — K50.80 CROHN'S DISEASE OF BOTH SMALL AND LARGE INTESTINE WITHOUT COMPLICATION: ICD-10-CM

## 2022-04-11 LAB
B-HCG UR QL: NEGATIVE
EXPIRATION DATE: NORMAL
INTERNAL NEGATIVE CONTROL: NORMAL
INTERNAL POSITIVE CONTROL: NORMAL
Lab: NORMAL

## 2022-04-11 PROCEDURE — 25010000002 PROPOFOL 10 MG/ML EMULSION: Performed by: NURSE ANESTHETIST, CERTIFIED REGISTERED

## 2022-04-11 PROCEDURE — 45385 COLONOSCOPY W/LESION REMOVAL: CPT | Performed by: INTERNAL MEDICINE

## 2022-04-11 PROCEDURE — 45380 COLONOSCOPY AND BIOPSY: CPT | Performed by: INTERNAL MEDICINE

## 2022-04-11 PROCEDURE — 81025 URINE PREGNANCY TEST: CPT | Performed by: INTERNAL MEDICINE

## 2022-04-11 RX ORDER — SODIUM CHLORIDE 9 MG/ML
70 INJECTION, SOLUTION INTRAVENOUS CONTINUOUS PRN
Status: DISCONTINUED | OUTPATIENT
Start: 2022-04-11 | End: 2022-04-11 | Stop reason: HOSPADM

## 2022-04-11 RX ORDER — LIDOCAINE HYDROCHLORIDE 20 MG/ML
INJECTION, SOLUTION INTRAVENOUS AS NEEDED
Status: DISCONTINUED | OUTPATIENT
Start: 2022-04-11 | End: 2022-04-11 | Stop reason: SURG

## 2022-04-11 RX ORDER — SIMETHICONE 20 MG/.3ML
EMULSION ORAL AS NEEDED
Status: DISCONTINUED | OUTPATIENT
Start: 2022-04-11 | End: 2022-04-11 | Stop reason: HOSPADM

## 2022-04-11 RX ORDER — MONTELUKAST SODIUM 4 MG/1
1 TABLET, CHEWABLE ORAL 2 TIMES DAILY
Qty: 60 TABLET | Refills: 2 | Status: SHIPPED | OUTPATIENT
Start: 2022-04-11

## 2022-04-11 RX ORDER — PROPOFOL 10 MG/ML
VIAL (ML) INTRAVENOUS AS NEEDED
Status: DISCONTINUED | OUTPATIENT
Start: 2022-04-11 | End: 2022-04-11 | Stop reason: SURG

## 2022-04-11 RX ADMIN — PROPOFOL 160 MCG/KG/MIN: 10 INJECTION, EMULSION INTRAVENOUS at 10:41

## 2022-04-11 RX ADMIN — PROPOFOL 50 MG: 10 INJECTION, EMULSION INTRAVENOUS at 10:41

## 2022-04-11 RX ADMIN — PROPOFOL 100 MG: 10 INJECTION, EMULSION INTRAVENOUS at 10:40

## 2022-04-11 RX ADMIN — SODIUM CHLORIDE 70 ML/HR: 9 INJECTION, SOLUTION INTRAVENOUS at 09:23

## 2022-04-11 RX ADMIN — LIDOCAINE HYDROCHLORIDE 40 MG: 20 INJECTION, SOLUTION INTRAVENOUS at 10:40

## 2022-04-11 NOTE — ANESTHESIA PREPROCEDURE EVALUATION
Anesthesia Evaluation     Patient summary reviewed and Nursing notes reviewed   NPO Solid Status: > 8 hours  NPO Liquid Status: > 8 hours           Airway   Mallampati: II  TM distance: >3 FB  No difficulty expected  Dental - normal exam     Pulmonary    (+) a smoker Current,   Cardiovascular - negative cardio ROS        Neuro/Psych  (+) psychiatric history Depression, Anxiety and Bipolar,    GI/Hepatic/Renal/Endo    (+) obesity,  GERD,  liver disease,     Musculoskeletal (-) negative ROS    Abdominal    Substance History   (+) alcohol use,      OB/GYN negative ob/gyn ROS         Other - negative ROS                       Anesthesia Plan    ASA 3     MAC     intravenous induction     Anesthetic plan, all risks, benefits, and alternatives have been provided, discussed and informed consent has been obtained with: patient.        CODE STATUS:

## 2022-04-11 NOTE — DISCHARGE INSTRUCTIONS
Rest today  No pushing,pulling,tugging,heavy lifting, or strenuous activity   No major decision making,driving,or drinking alcoholic beverages for 24 hours due to the sedation you received  Always use good hand hygiene/washing technique  No driving on pain medication.     To assist you in voiding:  Drink plenty of fluids  Listen to running water while attempting to void.    If you are unable to urinate and you have an uncomfortable urge to void or it has been   6 hours since you were discharged, return to the Emergency Room.    - Discharge patient to home (ambulatory).   - Resume previous diet.   - Continue present medications.   - Await pathology results.   - Colestipol 1 g po BID  - No NSAIDS  - Repeat colonoscopy in 2 years for surveillance.   - Return to GI office in 8 weeks.

## 2022-04-11 NOTE — H&P
[facility]  HISTORY AND PHYSICAL    Patient Name: Ashley Riley  : 1985  MRN: [id number]    Chief Complaint:   For surveillance colonoscopy    History Of Presenting Illness:    Ileo colonic crohn disease    Past Medical History:   Diagnosis Date   • Alcoholic liver damage (HCC)    • Anxiety    • Bipolar disorder (HCC)    • Bronchitis    • COVID-19 vaccine series completed    • Crohn's disease (HCC)    • Depression    • Drug-induced neutropenia (HCC)    • Ear piercing    • GERD (gastroesophageal reflux disease)    • H/O malignant neoplasm of thyroid    • Nasal lesion    • WEATHERS (nonalcoholic steatohepatitis)    • Olecranon bursitis    • Seasonal allergies    • Tattoo    • Vitamin B12 deficiency        Past Surgical History:   Procedure Laterality Date   • COLONOSCOPY     • ENDOSCOPY     • THYROIDECTOMY     • WISDOM TOOTH EXTRACTION         Social History     Socioeconomic History   • Marital status: Single   Tobacco Use   • Smoking status: Current Every Day Smoker     Packs/day: 1.00   • Smokeless tobacco: Never Used   Vaping Use   • Vaping Use: Never used   Substance and Sexual Activity   • Alcohol use: Yes     Alcohol/week: 14.0 standard drinks     Types: 14 Glasses of wine per week     Comment: DAILY ALCOHOL USE   • Drug use: Never   • Sexual activity: Defer       History reviewed. No pertinent family history.    Prior to Admission Medications:  Medications Prior to Admission   Medication Sig Dispense Refill Last Dose   • cholecalciferol (VITAMIN D3) 25 MCG (1000 UT) tablet Take 1,000 Units by mouth Daily.   4/10/2022 at 0900   • clotrimazole (MYCELEX) 10 MG amos Take 10 mg by mouth 5 (Five) Times a Day.   4/10/2022 at 0900   • Cyanocobalamin 1000 MCG/ML kit Inject  as directed Every 30 (Thirty) Days.   4/10/2022 at 0900   • folic acid (FOLVITE) 1 MG tablet Take 1 tablet by mouth Daily. 30 tablet 5 4/10/2022 at 0900   • ketoconazole (NIZORAL) 2 % cream Apply  topically to the appropriate  area as directed Daily.   Past Week at Unknown time   • levothyroxine (SYNTHROID, LEVOTHROID) 175 MCG tablet Take 175 mcg by mouth Daily.   4/10/2022 at 0900   • pantoprazole (PROTONIX) 20 MG EC tablet TAKE 2 TABLETS BY MOUTH DAILY 30 tablet 5 4/10/2022 at 0900   • propranolol (INDERAL) 40 MG tablet Take 40 mg by mouth Daily As Needed.   4/10/2022 at 0900   • Stelara 90 MG/ML solution prefilled syringe Injection MAINTENANCE: INJECT 1 SYRINGE SUBCUTANEOUSLY EVERY 8 WEEKS. REFRIGERATE. DO NOT FREEZE. 1 each 5 Past Month at Unknown time       Allergies:  Allergies   Allergen Reactions   • Remicade [Infliximab] Anaphylaxis        Vitals: Heart Rate:  [98] 98  Resp:  [17] 17  BP: (136)/(80) 136/80    Review Of Systems:  Constitutional:  Negative for chills, fever, and unexpected weight change.  Respiratory:  Negative for cough, chest tightness, shortness of breath, and wheezing.  Cardiovascular:  Negative for chest pain, palpitations, and leg swelling.  Gastrointestinal:  Negative for abdominal distention, abdominal pain, Nausea, vomiting.  Neurological:  Negative for Weakness, numbness, and headaches.     Physical Exam:    General Appearance:  Alert, cooperative, in no acute distress.   Lungs:   Clear to auscultation, respirations regular, even and                 unlabored.   Heart:  Regular rhythm and normal rate.   Abdomen:   Normal bowel sounds, no masses, no organomegaly. Soft, non-tender, non-distended   Neurologic: Alert and oriented x 3. Moves all four limbs equally       Plan: COLONOSCOPY (N/A)     Abhay Rascon MD  4/11/2022

## 2022-04-11 NOTE — ANESTHESIA POSTPROCEDURE EVALUATION
Patient: Ashley Riley    Procedure Summary     Date: 04/11/22 Room / Location: [facility] ENDOSCOPY 1 / [facility] ENDOSCOPY    Anesthesia Start: 1036 Anesthesia Stop:     Procedure: COLONOSCOPY with biopsies (N/A Anus) Diagnosis:       Crohn's disease of both small and large intestine without complication (HCC)      (Crohn's disease of both small and large intestine without complication (HCC) [K50.80])    Surgeons: Abhay Rascon MD Provider: Joe Broussard CRNA    Anesthesia Type: MAC ASA Status: 3          Anesthesia Type: MAC    Vitals  HR 53  Sat 100  Resp 14  BP 97/55        Post Anesthesia Care and Evaluation    Patient location during evaluation: bedside  Patient participation: complete - patient participated  Level of consciousness: awake and alert and sleepy but conscious  Pain score: 0  Pain management: adequate  Airway patency: patent  Anesthetic complications: No anesthetic complications  PONV Status: none  Cardiovascular status: acceptable  Respiratory status: acceptable  Hydration status: acceptable

## 2022-04-14 LAB
LAB AP CASE REPORT: NORMAL
PATH REPORT.FINAL DX SPEC: NORMAL

## 2022-05-03 ENCOUNTER — TELEPHONE (OUTPATIENT)
Dept: GASTROENTEROLOGY | Facility: CLINIC | Age: 37
End: 2022-05-03

## 2022-05-03 NOTE — TELEPHONE ENCOUNTER
----- Message from Abhay Rascon MD sent at 5/2/2022  6:43 PM EDT -----  Will wait at least 2 weeks since the diagnosis      ----- Message -----  From: Ashley Mackenzie MA  Sent: 5/2/2022  10:37 AM EDT  To: Abhay Rascon MD    Patient called today. She tested positive for COVID on 04/27. She was scheduled to take her Stelara yesterday but has not. How long should she wait before she can take it

## 2022-08-29 RX ORDER — PANTOPRAZOLE SODIUM 20 MG/1
40 TABLET, DELAYED RELEASE ORAL DAILY
Qty: 30 TABLET | Refills: 5 | Status: SHIPPED | OUTPATIENT
Start: 2022-08-29

## 2022-12-15 ENCOUNTER — PRIOR AUTHORIZATION (OUTPATIENT)
Dept: GASTROENTEROLOGY | Facility: CLINIC | Age: 37
End: 2022-12-15

## 2022-12-19 RX ORDER — FOLIC ACID 1 MG/1
1 TABLET ORAL DAILY
Qty: 30 TABLET | Refills: 5 | Status: SHIPPED | OUTPATIENT
Start: 2022-12-19

## 2023-02-09 ENCOUNTER — LAB (OUTPATIENT)
Dept: LAB | Facility: HOSPITAL | Age: 38
End: 2023-02-09
Payer: COMMERCIAL

## 2023-02-09 ENCOUNTER — TRANSCRIBE ORDERS (OUTPATIENT)
Dept: LAB | Facility: HOSPITAL | Age: 38
End: 2023-02-09
Payer: COMMERCIAL

## 2023-02-09 DIAGNOSIS — E55.9 AVITAMINOSIS D: ICD-10-CM

## 2023-02-09 DIAGNOSIS — I51.9 MYXEDEMA HEART DISEASE: Primary | ICD-10-CM

## 2023-02-09 DIAGNOSIS — K50.919 CROHN'S DISEASE WITH COMPLICATION, UNSPECIFIED GASTROINTESTINAL TRACT LOCATION: ICD-10-CM

## 2023-02-09 DIAGNOSIS — E53.8 BIOTIN-(PROPIONYL-COA-CARBOXYLASE) LIGASE DEFICIENCY: ICD-10-CM

## 2023-02-09 DIAGNOSIS — E03.9 MYXEDEMA HEART DISEASE: Primary | ICD-10-CM

## 2023-02-09 DIAGNOSIS — I51.9 MYXEDEMA HEART DISEASE: ICD-10-CM

## 2023-02-09 DIAGNOSIS — E03.9 MYXEDEMA HEART DISEASE: ICD-10-CM

## 2023-02-09 LAB
25(OH)D3 SERPL-MCNC: 36.9 NG/ML (ref 30–100)
ANISOCYTOSIS BLD QL: NORMAL
BASOPHILS # BLD AUTO: 0.03 10*3/MM3 (ref 0–0.2)
BASOPHILS NFR BLD AUTO: 1.1 % (ref 0–1.5)
DEPRECATED RDW RBC AUTO: 53.4 FL (ref 37–54)
EOSINOPHIL # BLD AUTO: 0.03 10*3/MM3 (ref 0–0.4)
EOSINOPHIL NFR BLD AUTO: 1.1 % (ref 0.3–6.2)
ERYTHROCYTE [DISTWIDTH] IN BLOOD BY AUTOMATED COUNT: 13.3 % (ref 12.3–15.4)
ERYTHROCYTE [SEDIMENTATION RATE] IN BLOOD: 17 MM/HR (ref 0–20)
FOLATE SERPL-MCNC: 10.1 NG/ML (ref 4.78–24.2)
HCT VFR BLD AUTO: 33.8 % (ref 34–46.6)
HGB BLD-MCNC: 12.2 G/DL (ref 12–15.9)
IMM GRANULOCYTES # BLD AUTO: 0.02 10*3/MM3 (ref 0–0.05)
IMM GRANULOCYTES NFR BLD AUTO: 0.8 % (ref 0–0.5)
LYMPHOCYTES # BLD AUTO: 1.43 10*3/MM3 (ref 0.7–3.1)
LYMPHOCYTES NFR BLD AUTO: 53.8 % (ref 19.6–45.3)
MCH RBC QN AUTO: 39.6 PG (ref 26.6–33)
MCHC RBC AUTO-ENTMCNC: 36.1 G/DL (ref 31.5–35.7)
MCV RBC AUTO: 109.7 FL (ref 79–97)
MICROCYTES BLD QL: NORMAL
MONOCYTES # BLD AUTO: 0.4 10*3/MM3 (ref 0.1–0.9)
MONOCYTES NFR BLD AUTO: 15 % (ref 5–12)
NEUTROPHILS NFR BLD AUTO: 0.75 10*3/MM3 (ref 1.7–7)
NEUTROPHILS NFR BLD AUTO: 28.2 % (ref 42.7–76)
NRBC BLD AUTO-RTO: 0 /100 WBC (ref 0–0.2)
PLAT MORPH BLD: NORMAL
PLATELET # BLD AUTO: 202 10*3/MM3 (ref 140–450)
PMV BLD AUTO: 10 FL (ref 6–12)
RBC # BLD AUTO: 3.08 10*6/MM3 (ref 3.77–5.28)
VIT B12 BLD-MCNC: 258 PG/ML (ref 211–946)
WBC MORPH BLD: NORMAL
WBC NRBC COR # BLD: 2.66 10*3/MM3 (ref 3.4–10.8)

## 2023-02-09 PROCEDURE — 82607 VITAMIN B-12: CPT

## 2023-02-09 PROCEDURE — 85652 RBC SED RATE AUTOMATED: CPT

## 2023-02-09 PROCEDURE — 85007 BL SMEAR W/DIFF WBC COUNT: CPT

## 2023-02-09 PROCEDURE — 82306 VITAMIN D 25 HYDROXY: CPT

## 2023-02-09 PROCEDURE — 82746 ASSAY OF FOLIC ACID SERUM: CPT

## 2023-02-09 PROCEDURE — 86140 C-REACTIVE PROTEIN: CPT

## 2023-02-09 PROCEDURE — 84439 ASSAY OF FREE THYROXINE: CPT

## 2023-02-09 PROCEDURE — 36415 COLL VENOUS BLD VENIPUNCTURE: CPT

## 2023-02-09 PROCEDURE — 80050 GENERAL HEALTH PANEL: CPT

## 2023-02-10 LAB
ALBUMIN SERPL-MCNC: 4.3 G/DL (ref 3.5–5.2)
ALBUMIN/GLOB SERPL: 1.7 G/DL
ALP SERPL-CCNC: 51 U/L (ref 39–117)
ALT SERPL W P-5'-P-CCNC: 53 U/L (ref 1–33)
ANION GAP SERPL CALCULATED.3IONS-SCNC: 10 MMOL/L (ref 5–15)
AST SERPL-CCNC: 58 U/L (ref 1–32)
BILIRUB SERPL-MCNC: 0.5 MG/DL (ref 0–1.2)
BUN SERPL-MCNC: 9 MG/DL (ref 6–20)
BUN/CREAT SERPL: 8.7 (ref 7–25)
CALCIUM SPEC-SCNC: 8.8 MG/DL (ref 8.6–10.5)
CHLORIDE SERPL-SCNC: 102 MMOL/L (ref 98–107)
CO2 SERPL-SCNC: 25 MMOL/L (ref 22–29)
CREAT SERPL-MCNC: 1.03 MG/DL (ref 0.57–1)
CRP SERPL-MCNC: <0.3 MG/DL (ref 0–0.5)
EGFRCR SERPLBLD CKD-EPI 2021: 70.6 ML/MIN/1.73
GLOBULIN UR ELPH-MCNC: 2.5 GM/DL
GLUCOSE SERPL-MCNC: 93 MG/DL (ref 65–99)
POTASSIUM SERPL-SCNC: 4.2 MMOL/L (ref 3.5–5.2)
PROT SERPL-MCNC: 6.8 G/DL (ref 6–8.5)
SODIUM SERPL-SCNC: 137 MMOL/L (ref 136–145)
T4 FREE SERPL-MCNC: 0.76 NG/DL (ref 0.93–1.7)
TSH SERPL DL<=0.05 MIU/L-ACNC: 50.6 UIU/ML (ref 0.27–4.2)

## 2023-03-16 RX ORDER — USTEKINUMAB 90 MG/ML
INJECTION, SOLUTION SUBCUTANEOUS
Qty: 1 EACH | Refills: 5 | Status: SHIPPED | OUTPATIENT
Start: 2023-03-16

## 2023-05-09 ENCOUNTER — OFFICE VISIT (OUTPATIENT)
Dept: GASTROENTEROLOGY | Facility: CLINIC | Age: 38
End: 2023-05-09
Payer: COMMERCIAL

## 2023-05-09 VITALS
SYSTOLIC BLOOD PRESSURE: 144 MMHG | TEMPERATURE: 97.8 F | BODY MASS INDEX: 27.46 KG/M2 | DIASTOLIC BLOOD PRESSURE: 82 MMHG | HEIGHT: 63 IN | OXYGEN SATURATION: 97 % | HEART RATE: 52 BPM | WEIGHT: 155 LBS

## 2023-05-09 DIAGNOSIS — K21.9 GASTROESOPHAGEAL REFLUX DISEASE WITHOUT ESOPHAGITIS: ICD-10-CM

## 2023-05-09 DIAGNOSIS — K76.0 FATTY LIVER: ICD-10-CM

## 2023-05-09 DIAGNOSIS — K50.80 CROHN'S DISEASE OF BOTH SMALL AND LARGE INTESTINE WITHOUT COMPLICATION: Primary | ICD-10-CM

## 2023-05-09 DIAGNOSIS — K58.0 IRRITABLE BOWEL SYNDROME WITH DIARRHEA: ICD-10-CM

## 2023-05-09 PROCEDURE — 99214 OFFICE O/P EST MOD 30 MIN: CPT | Performed by: INTERNAL MEDICINE

## 2023-05-09 RX ORDER — PANTOPRAZOLE SODIUM 20 MG/1
20 TABLET, DELAYED RELEASE ORAL DAILY
Qty: 30 TABLET | Refills: 11 | Status: SHIPPED | OUTPATIENT
Start: 2023-05-09

## 2023-05-09 RX ORDER — FLUTICASONE PROPIONATE 50 MCG
2 SPRAY, SUSPENSION (ML) NASAL DAILY
COMMUNITY

## 2023-05-09 NOTE — PROGRESS NOTES
Follow Up Note     Date: 2023   Patient Name: Ashley Riley  MRN: [id number]  : 1985     Referring Physician: Irish Villatoro MD    Chief Complaint:    Chief Complaint   Patient presents with   • Crohn's Disease   • IBS-D   • Heartburn       Interval History:   2023  Ashley Riley is a 38 y.o. female who is here today for follow up for her IBS symptoms and known history of Crohn's disease.  She states that she has been doing pretty well.  She has cut down alcohol and smoking but still continued to indulge in those.  She has been having mostly soft bowel movement anywhere 2 to 4/day without any blood.  She stopped colestipol as it caused nausea.     2021  Ashley Riley is a 36 y.o. female who is here today to establish care with Gastroenterology for further evaluation management of her Crohn's disease and suspected nonalcoholic fatty liver disease.  She was diagnosed with Crohn's disease of the small and large intestine in  with a colonoscopy and biopsies In [place] at [facility] and .  She was followed at [facility] colorectal [facility] before.  She was on Cimzia for about 5 yrs and imuran for 3 yrs. She was put on stelara 2020. She is currently on Stelara 90 mg subcu inj.. She will have bowel movement 2-3 times per day, loose. No blood in stool. She had recent one episode of lower abdomen week ago that has resolved. She did have intraabdominal abscess in   And had a drainage. No bowel resection.      This patient deny any melena.  Weight is stable. Pt denies nausea vomiting or odynophagia or dysphagia. There is history of acid reflux under well control with ppi. There is no history of anemia. No recent history of EGD. Last colonoscopy was  at [place]. As per patient there was not much inflammation.   Family history of colon cancer- Grandfather had colon Cancer and father had stomach cancer. No history of any abdominal surgery. She will have wine couple of  glass daily and she is a chronic smoker.     Subjective      Past Medical History:   Past Medical History:   Diagnosis Date   • Alcoholic liver damage    • Anxiety    • Bipolar disorder    • Bronchitis    • COVID-19 vaccine series completed    • Crohn's disease    • Depression    • Drug-induced neutropenia    • Ear piercing    • GERD (gastroesophageal reflux disease)    • H/O malignant neoplasm of thyroid    • Nasal lesion    • WEATHERS (nonalcoholic steatohepatitis)    • Olecranon bursitis    • Seasonal allergies    • Tattoo    • Vitamin B12 deficiency      Past Surgical History:   Past Surgical History:   Procedure Laterality Date   • COLONOSCOPY     • COLONOSCOPY N/A 4/11/2022    Procedure: COLONOSCOPY with biopsies polypectomy x 2;  Surgeon: Abhay Rascon MD;  Location: [facility] ENDOSCOPY;  Service: Gastroenterology;  Laterality: N/A;   • ENDOSCOPY     • THYROIDECTOMY     • WISDOM TOOTH EXTRACTION         Family History:   Family History   Problem Relation Age of Onset   • Stomach cancer Father    • Colon cancer Other         Grandparents       Social History:   Social History     Socioeconomic History   • Marital status: Single   Tobacco Use   • Smoking status: Every Day     Packs/day: 1.00     Types: Cigarettes   • Smokeless tobacco: Never   Vaping Use   • Vaping Use: Former   • Substances: Nicotine, Flavoring   • Devices: Disposable, Pre-filled or refillable cartridge, Refillable tank   Substance and Sexual Activity   • Alcohol use: Yes     Alcohol/week: 14.0 standard drinks     Types: 14 Glasses of wine per week     Comment: DAILY ALCOHOL USE   • Drug use: Never   • Sexual activity: Defer       Medications:     Current Outpatient Medications:   •  cholecalciferol (VITAMIN D3) 25 MCG (1000 UT) tablet, Take 1 tablet by mouth Daily., Disp: , Rfl:   •  Cyanocobalamin 1000 MCG/ML kit, Inject  as directed Every 30 (Thirty) Days., Disp: , Rfl:   •  fluticasone (FLONASE) 50 MCG/ACT nasal spray, 2 sprays into the  nostril(s) as directed by provider Daily., Disp: , Rfl:   •  folic acid (FOLVITE) 1 MG tablet, TAKE 1 TABLET BY MOUTH DAILY, Disp: 30 tablet, Rfl: 5  •  ketoconazole (NIZORAL) 2 % cream, Apply  topically to the appropriate area as directed Daily., Disp: , Rfl:   •  levothyroxine (SYNTHROID, LEVOTHROID) 175 MCG tablet, Take 1 tablet by mouth Daily., Disp: , Rfl:   •  Melatonin 5 MG capsule, Take  by mouth., Disp: , Rfl:   •  pantoprazole (PROTONIX) 20 MG EC tablet, TAKE 2 TABLETS BY MOUTH DAILY, Disp: 30 tablet, Rfl: 5  •  propranolol (INDERAL) 40 MG tablet, Take 1 tablet by mouth Daily As Needed., Disp: , Rfl:   •  Stelara 90 MG/ML solution prefilled syringe Injection, MAINTENANCE: INJECT 1 SYRINGE SUBCUTANEOUSLY EVERY 8 WEEKS. REFRIGERATE. DO NOT FREEZE., Disp: 1 each, Rfl: 5  •  clotrimazole (MYCELEX) 10 MG amos, Take 1 tablet by mouth 5 (Five) Times a Day., Disp: , Rfl:   •  colestipol (COLESTID) 1 g tablet, Take 1 tablet by mouth 2 (Two) Times a Day. (Patient not taking: Reported on 5/9/2023), Disp: 60 tablet, Rfl: 2    Allergies:   Allergies   Allergen Reactions   • Remicade [Infliximab] Anaphylaxis   • Mirtazapine Other (See Comments)     Unsure of reaction   • Nsaids GI Intolerance       Review of Systems:   Review of Systems   Constitutional: Positive for fatigue. Negative for appetite change, fever and unexpected weight loss.   HENT: Positive for postnasal drip and trouble swallowing.    Respiratory: Positive for choking.    Gastrointestinal: Positive for abdominal pain, diarrhea (greatly improved), rectal pain, vomiting and indigestion. Negative for abdominal distention, anal bleeding, blood in stool, constipation, nausea and GERD.       The following portions of the patient's history were reviewed and updated as appropriate: allergies, current medications, past family history, past medical history, past social history, past surgical history and problem list.    Objective     Physical Exam:  Vital Signs:  "  Vitals:    05/09/23 1618   BP: 144/82   Pulse: (!) 43   Temp: 97.8 °F (36.6 °C)   TempSrc: Infrared   Weight: 70.3 kg (155 lb)   Height: 160 cm (63\")  Comment: patient states       Physical Exam    Results Review:   I reviewed the patient's new clinical results.    Lab on 02/09/2023   Component Date Value Ref Range Status   • Sed Rate 02/09/2023 17  0 - 20 mm/hr Final   • Glucose 02/09/2023 93  65 - 99 mg/dL Final   • BUN 02/09/2023 9  6 - 20 mg/dL Final   • Creatinine 02/09/2023 1.03 (H)  0.57 - 1.00 mg/dL Final   • Sodium 02/09/2023 137  136 - 145 mmol/L Final   • Potassium 02/09/2023 4.2  3.5 - 5.2 mmol/L Final   • Chloride 02/09/2023 102  98 - 107 mmol/L Final   • CO2 02/09/2023 25.0  22.0 - 29.0 mmol/L Final   • Calcium 02/09/2023 8.8  8.6 - 10.5 mg/dL Final   • Total Protein 02/09/2023 6.8  6.0 - 8.5 g/dL Final   • Albumin 02/09/2023 4.3  3.5 - 5.2 g/dL Final   • ALT (SGPT) 02/09/2023 53 (H)  1 - 33 U/L Final   • AST (SGOT) 02/09/2023 58 (H)  1 - 32 U/L Final   • Alkaline Phosphatase 02/09/2023 51  39 - 117 U/L Final   • Total Bilirubin 02/09/2023 0.5  0.0 - 1.2 mg/dL Final   • Globulin 02/09/2023 2.5  gm/dL Final   • A/G Ratio 02/09/2023 1.7  g/dL Final   • BUN/Creatinine Ratio 02/09/2023 8.7  7.0 - 25.0 Final   • Anion Gap 02/09/2023 10.0  5.0 - 15.0 mmol/L Final   • eGFR 02/09/2023 70.6  >60.0 mL/min/1.73 Final   • 25 Hydroxy, Vitamin D 02/09/2023 36.9  30.0 - 100.0 ng/ml Final   • Vitamin B-12 02/09/2023 258  211 - 946 pg/mL Final   • TSH 02/09/2023 50.600 (H)  0.270 - 4.200 uIU/mL Final   • Folate 02/09/2023 10.10  4.78 - 24.20 ng/mL Final   • C-Reactive Protein 02/09/2023 <0.30  0.00 - 0.50 mg/dL Final   • WBC 02/09/2023 2.66 (L)  3.40 - 10.80 10*3/mm3 Final   • RBC 02/09/2023 3.08 (L)  3.77 - 5.28 10*6/mm3 Final   • Hemoglobin 02/09/2023 12.2  12.0 - 15.9 g/dL Final   • Hematocrit 02/09/2023 33.8 (L)  34.0 - 46.6 % Final   • MCV 02/09/2023 109.7 (H)  79.0 - 97.0 fL Final   • MCH 02/09/2023 39.6 (H)  " 26.6 - 33.0 pg Final   • MCHC 02/09/2023 36.1 (H)  31.5 - 35.7 g/dL Final   • RDW 02/09/2023 13.3  12.3 - 15.4 % Final   • RDW-SD 02/09/2023 53.4  37.0 - 54.0 fl Final   • MPV 02/09/2023 10.0  6.0 - 12.0 fL Final   • Platelets 02/09/2023 202  140 - 450 10*3/mm3 Final   • Neutrophil % 02/09/2023 28.2 (L)  42.7 - 76.0 % Final   • Lymphocyte % 02/09/2023 53.8 (H)  19.6 - 45.3 % Final   • Monocyte % 02/09/2023 15.0 (H)  5.0 - 12.0 % Final   • Eosinophil % 02/09/2023 1.1  0.3 - 6.2 % Final   • Basophil % 02/09/2023 1.1  0.0 - 1.5 % Final   • Immature Grans % 02/09/2023 0.8 (H)  0.0 - 0.5 % Final   • Neutrophils, Absolute 02/09/2023 0.75 (L)  1.70 - 7.00 10*3/mm3 Final   • Lymphocytes, Absolute 02/09/2023 1.43  0.70 - 3.10 10*3/mm3 Final   • Monocytes, Absolute 02/09/2023 0.40  0.10 - 0.90 10*3/mm3 Final   • Eosinophils, Absolute 02/09/2023 0.03  0.00 - 0.40 10*3/mm3 Final   • Basophils, Absolute 02/09/2023 0.03  0.00 - 0.20 10*3/mm3 Final   • Immature Grans, Absolute 02/09/2023 0.02  0.00 - 0.05 10*3/mm3 Final   • nRBC 02/09/2023 0.0  0.0 - 0.2 /100 WBC Final   • Anisocytosis 02/09/2023 Mod/2+  None Seen Final   • Microcytes 02/09/2023 Mod/2+  None Seen Final   • WBC Morphology 02/09/2023 Normal  Normal Final   • Platelet Morphology 02/09/2023 Normal  Normal Final   • Free T4 02/09/2023 0.76 (L)  0.93 - 1.70 ng/dL Final      MR BRAIN WITH & WITHOUT IV CONTRAST    Result Date: 2/24/2023  No acute intracranial process. Images reviewed, interpreted, and dictated by Dr. Dat Damon. Transcribed by ELISA Christie (R).    Colonoscopy 4/11/2022  - Congested mucosa in the sigmoid colon. Biopsied.  - Two 3 to 6 mm polyps in the proximal descending colon, removed with a cold snare. Resected and retrieved.  Clinicaly pseudopolyps  - Non-bleeding external and internal hemorrhoids.  - Healed Scar in the rectum and in the proximal ascending colon.  - The examined portion of the ileum was normal. Biopsied.  - Four biopsies were  obtained in the rectum, in the sigmoid colon, in the descending colon, in the transverse  colon, in the ascending colon and in the cecum.  - No endoscopic evidence of signicantr colitis or ileits. and appears to be in remission.    Pathology; multiple colonic segment biopsies and the TI biopsies were normal for any colitis.    Assessment / Plan      1. Crohn's disease of both small and large intestine without complication, on remission  2.  Suspected irritable bowel syndrome  3.  History of low folate level and vitamin B12 deficiency  5/9/2023  Recent lab work done on 2/9/2023 reviewed and reveals a normal CMP except elevated liver enzymes hepatocellular pattern which is chronic.  TSH is still high 50.  Folic acid was normal 10 vitamin B12 is borderline 258.  CRP less than 0.30.  Hemoglobin was 12.2 WBC 2.66 and normal platelet of 20,000.  Colonoscopy done on 4/11/2022 did not reveal any endoscopic signs of colitis or ileitis.  Multiple colonic biopsies and TI biopsies were normal.  Changing her diet helped significantly to improve her bowel symptoms.  Clinically patient is doing pretty well and does not want to change any medications.    We will continue her Stelara as before  We will get a Stelara trough level with antibodies  Advised to discuss with PCP regarding increasing dose of thyroxine her TSH again superhigh  Again discussed done complete cessation from smoking and cut down alcohol consumption.  Needs repeat colonoscopy for surveillance in April 2025 8/4/2021  [facility] elast few weeks she will have loose stool 3-5 per day. Otherwise she was having solid stool.   Her stool calprotectin is 42 within normal limits.  Recent CBC, CMP reviewed unremarkable except elevated liver enzymes and borderline low WBCs 2.4.  Platelets were normal hemoglobin was normal.  Her folate level was low less than two.  But her vitamin B12 was normal. Her history is not suggestive of IBD flare.  We will start her on half a pill of  Imodium as needed. We will start her on folic acid 1 mg p.o. daily     5/4/2021  Patient was diagnosed with ileocolonic Crohn's disease in 2014 at [facility].  She was initially on a Cimzia and Imuran.  It is unclear why the Cimzia was changed to Stelara.  Patient could not give a clear answer on that.  She has been taking Stelara since January 2020.  She did have a prior history of Crohn's abscess in 2016 and had a drainage but no bowel resections.     She is clinically appears to be doing well with the Stelara now.  Her last dose was 2 weeks ago.  Last fecal calprotectin in October 2020 was normal. Her history is suggestive of mild irritable bowel along with her IBD.  Her last colonoscopy was in 2019 as per patient and was been told that no significant inflammation was noted the colonoscopy.  No full report available to interpret. We will continue her self Stelara 90 mg subcu q. 8 weeks  Colonoscopy in 2022     4. Gastroesophageal reflux disease without esophagitis  Reflux symptoms well controlled with low-dose PPI.  Advised to consider PPI as needed if no significant symptoms.    5.  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  She continues to smoke and continues to drink alcohol.   She drinks at least a few glasses of wine daily.  Patient likely has a fatty liver disease mostly from the alcohol.  Counseled on complete cessation from alcohol consumption    8/4/2021  She is not immune to hepatitis B but immune to hepatitis A. hep C antibody is negative.  JUAN, anti-smooth muscle antibody, antimitochondrial antibodies negative ruling out autoimmune otitis and PBC.  Her cellular plasma level was normal alpha-1 antitrypsin level and phenotype was normal.  Iron saturation within normal limits ruling out hemochromatosis. Hepatocellular pattern of elevated liver enzymes with ALT more than  4-5 times upper limit of normal.  This is concerning for autoimmune hepatitis.     Prior history  6. Encounter for screening for malignant neoplasm of colon  7.  Family history of colon cancer  Last colonoscopy was 2019 no polyps removed.  Her grandfather had a colon cancer she is due for surveillance colonoscopy next year in 2022  8. Class 1 obesity due to excess calories without serious comorbidity with body mass index (BMI) of 32.0 to 32.9 in adult      Follow Up:   No follow-ups on file.    Abhay Rascon MD  [facility]  5/9/2023  16:21 EDT     Please note that portions of this note may have been completed with a voice recognition program.

## 2023-05-22 ENCOUNTER — LAB (OUTPATIENT)
Dept: LAB | Facility: HOSPITAL | Age: 38
End: 2023-05-22
Payer: COMMERCIAL

## 2023-05-22 DIAGNOSIS — K50.80 CROHN'S DISEASE OF BOTH SMALL AND LARGE INTESTINE WITHOUT COMPLICATION: ICD-10-CM

## 2023-05-22 PROCEDURE — 82397 CHEMILUMINESCENT ASSAY: CPT

## 2023-05-22 PROCEDURE — 80299 QUANTITATIVE ASSAY DRUG: CPT

## 2023-05-22 PROCEDURE — 36415 COLL VENOUS BLD VENIPUNCTURE: CPT

## 2023-05-30 LAB
USTEKINUMAB AB SERPL IA-MCNC: <40 NG/ML
USTEKINUMAB SERPL IA-MCNC: 3.5 UG/ML

## 2023-08-23 RX ORDER — FOLIC ACID 1 MG/1
1 TABLET ORAL DAILY
Qty: 30 TABLET | Refills: 5 | Status: SHIPPED | OUTPATIENT
Start: 2023-08-23

## 2023-08-23 RX ORDER — FOLIC ACID 1 MG/1
1 TABLET ORAL DAILY
Qty: 30 TABLET | Refills: 5 | OUTPATIENT
Start: 2023-08-23

## 2023-11-15 RX ORDER — PANTOPRAZOLE SODIUM 20 MG/1
40 TABLET, DELAYED RELEASE ORAL DAILY
Qty: 30 TABLET | Refills: 11 | Status: SHIPPED | OUTPATIENT
Start: 2023-11-15

## 2023-11-17 ENCOUNTER — PRIOR AUTHORIZATION (OUTPATIENT)
Dept: GASTROENTEROLOGY | Facility: CLINIC | Age: 38
End: 2023-11-17
Payer: COMMERCIAL

## 2024-02-05 ENCOUNTER — TELEPHONE (OUTPATIENT)
Dept: GASTROENTEROLOGY | Facility: CLINIC | Age: 39
End: 2024-02-05
Payer: COMMERCIAL

## 2024-02-05 NOTE — TELEPHONE ENCOUNTER
Called patient's mother to check to see if there has been a change in insurance. She has been taking Stelara for quite sometime for a much cheaper cost. Reached voicemail. Left detailed message for return call to discuss

## 2024-02-05 NOTE — TELEPHONE ENCOUNTER
Hub staff attempted to follow warm transfer process and was unsuccessful     Caller: ETTA CORBETT    Relationship to patient: Mother    Best call back number: [phone]    Patient is needing: PATIENT'S MOTHERETTA CALLED IN AND STATED THAT THE PATIENT'S STELARA MEDICATION IS GOING TO COST OVER $8,000 AND THEY WOULD LIKE TO KNOW IF THERE IS ANYTHING THEY CAN REPLACE IT WITH. PLEASE CALL BACK TO ADVISE. OKAY TO CALL ANYTIME, OKAY TO LEAVE .

## 2024-02-05 NOTE — TELEPHONE ENCOUNTER
Spoke to patient's mother. Her insurance has not changed. The pharmacy is stating that her portion would be $8000.

## 2024-02-05 NOTE — TELEPHONE ENCOUNTER
Called and spoke with the reps with [employer]. Most likely this is a co-pay card issue. They are going to expedite this to get it taken care of and will send samples to the office for patient in the meantime. Patient's mother has been notified of update. Will call to update her more once I have more information.

## 2024-02-07 NOTE — TELEPHONE ENCOUNTER
Spoke with Moreno with Stelara. From what she has investigated thus far patient needs a new virtual copay card. The money is currently available on her account and will continuously reload after each pickup. I called and spoke with patient's mom, Dyana and gave her the information to call for virtual card ([phone]). I also let her know we do have samples on the way for her and will let her know once they arrive to the office.

## 2024-04-14 ENCOUNTER — APPOINTMENT (OUTPATIENT)
Dept: CT IMAGING | Facility: HOSPITAL | Age: 39
End: 2024-04-14
Payer: COMMERCIAL

## 2024-04-14 ENCOUNTER — HOSPITAL ENCOUNTER (EMERGENCY)
Facility: HOSPITAL | Age: 39
Discharge: HOME | End: 2024-04-14
Attending: EMERGENCY MEDICINE | Admitting: EMERGENCY MEDICINE
Payer: COMMERCIAL

## 2024-04-14 VITALS
OXYGEN SATURATION: 98 % | HEIGHT: 63 IN | WEIGHT: 175 LBS | RESPIRATION RATE: 18 BRPM | BODY MASS INDEX: 31.01 KG/M2 | HEART RATE: 62 BPM | DIASTOLIC BLOOD PRESSURE: 74 MMHG | TEMPERATURE: 98.2 F | SYSTOLIC BLOOD PRESSURE: 118 MMHG

## 2024-04-14 DIAGNOSIS — I25.10 CORONARY ARTERY DISEASE, UNSPECIFIED VESSEL OR LESION TYPE, UNSPECIFIED WHETHER ANGINA PRESENT, UNSPECIFIED WHETHER NATIVE OR TRANSPLANTED HEART: ICD-10-CM

## 2024-04-14 DIAGNOSIS — R11.2 NAUSEA AND VOMITING, UNSPECIFIED VOMITING TYPE: ICD-10-CM

## 2024-04-14 DIAGNOSIS — R39.11 URINARY HESITANCY: ICD-10-CM

## 2024-04-14 DIAGNOSIS — R07.9 CHEST PAIN, UNSPECIFIED TYPE: ICD-10-CM

## 2024-04-14 DIAGNOSIS — N30.90 CYSTITIS: Primary | ICD-10-CM

## 2024-04-14 LAB
ALBUMIN SERPL-MCNC: 4.6 G/DL (ref 3.5–5.2)
ALBUMIN/GLOB SERPL: 1.6 G/DL
ALP SERPL-CCNC: 61 U/L (ref 39–117)
ALT SERPL W P-5'-P-CCNC: 90 U/L (ref 1–33)
ANION GAP SERPL CALCULATED.3IONS-SCNC: 14.7 MMOL/L (ref 5–15)
AST SERPL-CCNC: 126 U/L (ref 1–32)
BACTERIA UR QL AUTO: ABNORMAL /HPF
BILIRUB SERPL-MCNC: 0.3 MG/DL (ref 0–1.2)
BILIRUB UR QL STRIP: NEGATIVE
BUN SERPL-MCNC: 10 MG/DL (ref 6–20)
BUN/CREAT SERPL: 10.3 (ref 7–25)
CALCIUM SPEC-SCNC: 9.2 MG/DL (ref 8.6–10.5)
CHLORIDE SERPL-SCNC: 101 MMOL/L (ref 98–107)
CLARITY UR: CLEAR
CO2 SERPL-SCNC: 24.3 MMOL/L (ref 22–29)
COLOR UR: ABNORMAL
CREAT SERPL-MCNC: 0.97 MG/DL (ref 0.57–1)
DEPRECATED RDW RBC AUTO: 64 FL (ref 37–54)
EGFRCR SERPLBLD CKD-EPI 2021: 75.4 ML/MIN/1.73
EOSINOPHIL # BLD MANUAL: 0.08 10*3/MM3 (ref 0–0.4)
EOSINOPHIL NFR BLD MANUAL: 2 % (ref 0.3–6.2)
ERYTHROCYTE [DISTWIDTH] IN BLOOD BY AUTOMATED COUNT: 15.9 % (ref 12.3–15.4)
GLOBULIN UR ELPH-MCNC: 2.8 GM/DL
GLUCOSE SERPL-MCNC: 91 MG/DL (ref 65–99)
GLUCOSE UR STRIP-MCNC: NEGATIVE MG/DL
HCG SERPL QL: NEGATIVE
HCT VFR BLD AUTO: 34.1 % (ref 34–46.6)
HGB BLD-MCNC: 11.9 G/DL (ref 12–15.9)
HGB UR QL STRIP.AUTO: ABNORMAL
HOLD SPECIMEN: NORMAL
HOLD SPECIMEN: NORMAL
HYALINE CASTS UR QL AUTO: ABNORMAL /LPF
KETONES UR QL STRIP: NEGATIVE
LEUKOCYTE ESTERASE UR QL STRIP.AUTO: NEGATIVE
LIPASE SERPL-CCNC: 39 U/L (ref 13–60)
LYMPHOCYTES # BLD MANUAL: 1.82 10*3/MM3 (ref 0.7–3.1)
LYMPHOCYTES NFR BLD MANUAL: 8 % (ref 5–12)
MACROCYTES BLD QL SMEAR: ABNORMAL
MCH RBC QN AUTO: 38.1 PG (ref 26.6–33)
MCHC RBC AUTO-ENTMCNC: 34.9 G/DL (ref 31.5–35.7)
MCV RBC AUTO: 109.3 FL (ref 79–97)
MONOCYTES # BLD: 0.3 10*3/MM3 (ref 0.1–0.9)
NEUTROPHILS # BLD AUTO: 1.6 10*3/MM3 (ref 1.7–7)
NEUTROPHILS NFR BLD MANUAL: 38 % (ref 42.7–76)
NEUTS BAND NFR BLD MANUAL: 4 % (ref 0–5)
NITRITE UR QL STRIP: NEGATIVE
NT-PROBNP SERPL-MCNC: 44.2 PG/ML (ref 0–450)
PH UR STRIP.AUTO: 6 [PH] (ref 5–8)
PLATELET # BLD AUTO: 223 10*3/MM3 (ref 140–450)
PMV BLD AUTO: 8.9 FL (ref 6–12)
POTASSIUM SERPL-SCNC: 3.7 MMOL/L (ref 3.5–5.2)
PROT SERPL-MCNC: 7.4 G/DL (ref 6–8.5)
PROT UR QL STRIP: NEGATIVE
RBC # BLD AUTO: 3.12 10*6/MM3 (ref 3.77–5.28)
RBC # UR STRIP: ABNORMAL /HPF
REF LAB TEST METHOD: ABNORMAL
SMALL PLATELETS BLD QL SMEAR: ADEQUATE
SODIUM SERPL-SCNC: 140 MMOL/L (ref 136–145)
SP GR UR STRIP: 1.01 (ref 1–1.03)
SQUAMOUS #/AREA URNS HPF: ABNORMAL /HPF
TROPONIN T SERPL HS-MCNC: <6 NG/L
UROBILINOGEN UR QL STRIP: ABNORMAL
VARIANT LYMPHS NFR BLD MANUAL: 48 % (ref 19.6–45.3)
WBC # UR STRIP: ABNORMAL /HPF
WBC MORPH BLD: NORMAL
WBC NRBC COR # BLD AUTO: 3.8 10*3/MM3 (ref 3.4–10.8)
WHOLE BLOOD HOLD COAG: NORMAL
WHOLE BLOOD HOLD SPECIMEN: NORMAL

## 2024-04-14 PROCEDURE — 84703 CHORIONIC GONADOTROPIN ASSAY: CPT | Performed by: PHYSICIAN ASSISTANT

## 2024-04-14 PROCEDURE — 25010000002 METOCLOPRAMIDE PER 10 MG: Performed by: PHYSICIAN ASSISTANT

## 2024-04-14 PROCEDURE — 83690 ASSAY OF LIPASE: CPT | Performed by: PHYSICIAN ASSISTANT

## 2024-04-14 PROCEDURE — 25510000001 IOPAMIDOL 61 % SOLUTION: Performed by: EMERGENCY MEDICINE

## 2024-04-14 PROCEDURE — 74177 CT ABD & PELVIS W/CONTRAST: CPT

## 2024-04-14 PROCEDURE — 81001 URINALYSIS AUTO W/SCOPE: CPT | Performed by: PHYSICIAN ASSISTANT

## 2024-04-14 PROCEDURE — 85025 COMPLETE CBC W/AUTO DIFF WBC: CPT | Performed by: PHYSICIAN ASSISTANT

## 2024-04-14 PROCEDURE — 85007 BL SMEAR W/DIFF WBC COUNT: CPT | Performed by: PHYSICIAN ASSISTANT

## 2024-04-14 PROCEDURE — 96374 THER/PROPH/DIAG INJ IV PUSH: CPT

## 2024-04-14 PROCEDURE — 84484 ASSAY OF TROPONIN QUANT: CPT | Performed by: PHYSICIAN ASSISTANT

## 2024-04-14 PROCEDURE — 80053 COMPREHEN METABOLIC PANEL: CPT | Performed by: PHYSICIAN ASSISTANT

## 2024-04-14 PROCEDURE — 25010000002 DIPHENHYDRAMINE PER 50 MG: Performed by: PHYSICIAN ASSISTANT

## 2024-04-14 PROCEDURE — 71275 CT ANGIOGRAPHY CHEST: CPT

## 2024-04-14 PROCEDURE — 96375 TX/PRO/DX INJ NEW DRUG ADDON: CPT

## 2024-04-14 PROCEDURE — 25810000003 SODIUM CHLORIDE 0.9 % SOLUTION: Performed by: PHYSICIAN ASSISTANT

## 2024-04-14 PROCEDURE — 83880 ASSAY OF NATRIURETIC PEPTIDE: CPT | Performed by: PHYSICIAN ASSISTANT

## 2024-04-14 PROCEDURE — 99285 EMERGENCY DEPT VISIT HI MDM: CPT

## 2024-04-14 PROCEDURE — 93005 ELECTROCARDIOGRAM TRACING: CPT

## 2024-04-14 RX ORDER — SODIUM CHLORIDE 0.9 % (FLUSH) 0.9 %
10 SYRINGE (ML) INJECTION AS NEEDED
Status: DISCONTINUED | OUTPATIENT
Start: 2024-04-14 | End: 2024-04-14 | Stop reason: HOSPADM

## 2024-04-14 RX ORDER — FAMOTIDINE 10 MG/ML
20 INJECTION, SOLUTION INTRAVENOUS ONCE
Status: COMPLETED | OUTPATIENT
Start: 2024-04-14 | End: 2024-04-14

## 2024-04-14 RX ORDER — ONDANSETRON 4 MG/1
4 TABLET, ORALLY DISINTEGRATING ORAL EVERY 8 HOURS PRN
Qty: 12 TABLET | Refills: 0 | Status: SHIPPED | OUTPATIENT
Start: 2024-04-14

## 2024-04-14 RX ORDER — METOCLOPRAMIDE HYDROCHLORIDE 5 MG/ML
10 INJECTION INTRAMUSCULAR; INTRAVENOUS ONCE
Status: COMPLETED | OUTPATIENT
Start: 2024-04-14 | End: 2024-04-14

## 2024-04-14 RX ORDER — KETOROLAC TROMETHAMINE 30 MG/ML
15 INJECTION, SOLUTION INTRAMUSCULAR; INTRAVENOUS ONCE
Status: DISCONTINUED | OUTPATIENT
Start: 2024-04-14 | End: 2024-04-14

## 2024-04-14 RX ORDER — DIPHENHYDRAMINE HYDROCHLORIDE 50 MG/ML
25 INJECTION INTRAMUSCULAR; INTRAVENOUS ONCE
Status: COMPLETED | OUTPATIENT
Start: 2024-04-14 | End: 2024-04-14

## 2024-04-14 RX ADMIN — SODIUM CHLORIDE 1000 ML: 9 INJECTION, SOLUTION INTRAVENOUS at 19:04

## 2024-04-14 RX ADMIN — FAMOTIDINE 20 MG: 10 INJECTION, SOLUTION INTRAVENOUS at 19:04

## 2024-04-14 RX ADMIN — DIPHENHYDRAMINE HYDROCHLORIDE 25 MG: 50 INJECTION, SOLUTION INTRAMUSCULAR; INTRAVENOUS at 19:07

## 2024-04-14 RX ADMIN — IOPAMIDOL 100 ML: 612 INJECTION, SOLUTION INTRAVENOUS at 20:08

## 2024-04-14 RX ADMIN — METOCLOPRAMIDE 10 MG: 5 INJECTION, SOLUTION INTRAMUSCULAR; INTRAVENOUS at 19:06

## 2024-04-14 NOTE — ED PROVIDER NOTES
Subjective:  Chief Complaint:  Multiple complaints    History of Present Illness:  Patient is a 39-year-old female with history of anxiety, bipolar disorder, Crohn's disease, GERD, WEATHERS, among others presenting to the ER with multiple complaints including chest pain, shortness of breath, abdominal pain, nausea, vomiting which she states has been going on for the last 2 weeks.  Patient denies any cardiac history in the past.  She states the chest pain is stabbing in nature and will last for couple days and then get better and then return.  Patient notes that she did take her Stelara shot a couple of days ago which she receives for her Crohn's.  She states she does sometimes have side effects to that.  She does also state that she feels like she has had a UTI for the last few weeks.  She states she is on her period currently and is unsure if she has any blood in her urine because of that.  She states she does have lower abdominal pain that radiates to her flank and lower back but also complains of epigastric and chest pain.  Patient denies additional symptoms or complaints at this time.      Nurses Notes reviewed and agree, including vitals, allergies, social history and prior medical history.     REVIEW OF SYSTEMS: All systems reviewed and not pertinent unless noted.  Review of Systems   Respiratory:  Positive for shortness of breath.    Cardiovascular:  Positive for chest pain.   Gastrointestinal:  Positive for abdominal pain, nausea and vomiting.   All other systems reviewed and are negative.      Past Medical History:   Diagnosis Date    Alcoholic liver damage     Anxiety     Bipolar disorder     Bronchitis     COVID-19 vaccine series completed     Crohn's disease     Depression     Drug-induced neutropenia     Ear piercing     GERD (gastroesophageal reflux disease)     H/O malignant neoplasm of thyroid     Nasal lesion     WEATHERS (nonalcoholic steatohepatitis)     Olecranon bursitis     Seasonal allergies      "Tattoo     Vitamin B12 deficiency        Allergies:    Remicade [infliximab], Mirtazapine, and Nsaids      Past Surgical History:   Procedure Laterality Date    COLONOSCOPY      COLONOSCOPY N/A 4/11/2022    Procedure: COLONOSCOPY with biopsies polypectomy x 2;  Surgeon: Abhay Rascon MD;  Location: [facility];  Service: Gastroenterology;  Laterality: N/A;    ENDOSCOPY      THYROIDECTOMY      WISDOM TOOTH EXTRACTION           Social History     Socioeconomic History    Marital status: Single   Tobacco Use    Smoking status: Every Day     Current packs/day: 1.00     Types: Cigarettes    Smokeless tobacco: Never   Vaping Use    Vaping status: Former    Substances: Nicotine, Flavoring    Devices: Disposable, Pre-filled or refillable cartridge, Refillable tank   Substance and Sexual Activity    Alcohol use: Yes     Alcohol/week: 14.0 standard drinks of alcohol     Types: 14 Glasses of wine per week     Comment: DAILY ALCOHOL USE    Drug use: Never    Sexual activity: Defer         Family History   Problem Relation Age of Onset    Stomach cancer Father     Colon cancer Other         Grandparents       Objective  Physical Exam:  /79   Pulse 59   Temp 98.1 °F (36.7 °C) (Oral)   Resp 16   Ht 160 cm (63\")   Wt 79.4 kg (175 lb)   SpO2 95%   BMI 31.00 kg/m²      Physical Exam  Vitals and nursing note reviewed.   Constitutional:       General: She is not in acute distress.     Appearance: She is not toxic-appearing.   HENT:      Head: Normocephalic and atraumatic.   Eyes:      Extraocular Movements: Extraocular movements intact.   Cardiovascular:      Rate and Rhythm: Normal rate.      Heart sounds: Normal heart sounds.   Pulmonary:      Effort: Pulmonary effort is normal.      Breath sounds: Normal breath sounds.   Abdominal:      Palpations: Abdomen is soft.      Tenderness: There is abdominal tenderness. There is no guarding or rebound.   Musculoskeletal:         General: Normal range of motion.    "   Cervical back: Normal range of motion and neck supple.   Skin:     General: Skin is warm and dry.   Neurological:      General: No focal deficit present.      Mental Status: She is alert and oriented to person, place, and time.   Psychiatric:         Mood and Affect: Mood normal.         Behavior: Behavior normal.         Procedures    ED Course:         Lab Results (last 24 hours)       Procedure Component Value Units Date/Time    CBC & Differential [[id number]]  (Abnormal) Collected: 04/14/24 1850    Specimen: Blood Updated: 04/14/24 1950    Narrative:      The following orders were created for panel order CBC & Differential.  Procedure                               Abnormality         Status                     ---------                               -----------         ------                     CBC Auto Differential[[id number]]        Abnormal            Final result               Scan Slide[[id number]]                                                                    Please view results for these tests on the individual orders.    Comprehensive Metabolic Panel [[id number]]  (Abnormal) Collected: 04/14/24 1850    Specimen: Blood Updated: 04/14/24 1924     Glucose 91 mg/dL      BUN 10 mg/dL      Creatinine 0.97 mg/dL      Sodium 140 mmol/L      Potassium 3.7 mmol/L      Chloride 101 mmol/L      CO2 24.3 mmol/L      Calcium 9.2 mg/dL      Total Protein 7.4 g/dL      Albumin 4.6 g/dL      ALT (SGPT) 90 U/L      AST (SGOT) 126 U/L      Alkaline Phosphatase 61 U/L      Total Bilirubin 0.3 mg/dL      Globulin 2.8 gm/dL      A/G Ratio 1.6 g/dL      BUN/Creatinine Ratio 10.3     Anion Gap 14.7 mmol/L      eGFR 75.4 mL/min/1.73     Narrative:      GFR Normal >60  Chronic Kidney Disease <60  Kidney Failure <15      hCG, Serum, Qualitative [[id number]]  (Normal) Collected: 04/14/24 1850    Specimen: Blood Updated: 04/14/24 1947     HCG Qualitative Negative    Lipase [[id number]]  (Normal) Collected: 04/14/24 1850     Specimen: Blood Updated: 04/14/24 1915     Lipase 39 U/L     Single High Sensitivity Troponin T [[id number]]  (Normal) Collected: 04/14/24 1850    Specimen: Blood Updated: 04/14/24 1918     HS Troponin T <6 ng/L     Narrative:      High Sensitive Troponin T Reference Range:  <14.0 ng/L- Negative Female for AMI  <22.0 ng/L- Negative Male for AMI  >=14 - Abnormal Female indicating possible myocardial injury.  >=22 - Abnormal Male indicating possible myocardial injury.   Clinicians would have to utilize clinical acumen, EKG, Troponin, and serial changes to determine if it is an Acute Myocardial Infarction or myocardial injury due to an underlying chronic condition.         BNP [[id number]]  (Normal) Collected: 04/14/24 1850    Specimen: Blood Updated: 04/14/24 1918     proBNP 44.2 pg/mL     Narrative:      This assay is used as an aid in the diagnosis of individuals suspected of having heart failure. It can be used as an aid in the diagnosis of acute decompensated heart failure (ADHF) in patients presenting with signs and symptoms of ADHF to the emergency department (ED). In addition, NT-proBNP of <300 pg/mL indicates ADHF is not likely.    Age Range Result Interpretation  NT-proBNP Concentration (pg/mL:      <50             Positive            >450                   Gray                 300-450                    Negative             <300    50-75           Positive            >900                  Gray                300-900                  Negative            <300      >75             Positive            >1800                  Gray                300-1800                  Negative            <300    CBC Auto Differential [[id number]]  (Abnormal) Collected: 04/14/24 1850    Specimen: Blood Updated: 04/14/24 1950     WBC 3.80 10*3/mm3      RBC 3.12 10*6/mm3      Hemoglobin 11.9 g/dL      Hematocrit 34.1 %      .3 fL      MCH 38.1 pg      MCHC 34.9 g/dL      RDW 15.9 %      RDW-SD 64.0 fl      MPV 8.9 fL       Platelets 223 10*3/mm3     Manual Differential [[id number]]  (Abnormal) Collected: 04/14/24 1850    Specimen: Blood Updated: 04/14/24 2011     Neutrophil % 38.0 %      Lymphocyte % 48.0 %      Monocyte % 8.0 %      Eosinophil % 2.0 %      Bands %  4.0 %      Neutrophils Absolute 1.60 10*3/mm3      Lymphocytes Absolute 1.82 10*3/mm3      Monocytes Absolute 0.30 10*3/mm3      Eosinophils Absolute 0.08 10*3/mm3      Macrocytes Mod/2+     WBC Morphology Normal     Platelet Estimate Adequate    Urinalysis With Microscopic If Indicated (No Culture) - Urine, Clean Catch [[id number]]  (Abnormal) Collected: 04/14/24 1936    Specimen: Urine, Clean Catch Updated: 04/14/24 1959     Color, UA [place]     Appearance, UA Clear     pH, UA 6.0     Specific Gravity, UA 1.010     Glucose, UA Negative     Ketones, UA Negative     Bilirubin, UA Negative     Blood, UA Large (3+)     Protein, UA Negative     Leuk Esterase, UA Negative     Nitrite, UA Negative     Urobilinogen, UA 0.2 E.U./dL    Urinalysis, Microscopic Only - Urine, Clean Catch [[id number]]  (Abnormal) Collected: 04/14/24 1936    Specimen: Urine, Clean Catch Updated: 04/14/24 2037     RBC, UA 6-10 /HPF      WBC, UA None Seen /HPF      Bacteria, UA None Seen /HPF      Squamous Epithelial Cells, UA 7-12 /HPF      Hyaline Casts, UA None Seen /LPF      Methodology Manual Light Microscopy             CT Abdomen Pelvis With Contrast    Result Date: 4/14/2024  PRELIMINARY REPORT TECHNIQUE: Axial CT images were performed from the lung bases through the symphysis pubis after the administration of intravenous contrast.  This study was performed with techniques to keep radiation doses as low as reasonably achievable (ALARA). Individualized dose reduction techniques using automated exposure control or adjustment of mA and/or kV according to the patient's size were employed. CLINICAL HISTORY: diffuse abdominal pain, n/v FINDINGS: ABDOMEN/PELVIS:  Liver, gallbladder and bile ducts: There is  hepatic steatosis.  The gallbladder is unremarkable.  There is no definite biliary duct dilatation.  Adrenal glands: The adrenal glands are morphologically unremarkable without suspicious lesion.  Kidneys, ureter and urinary bladder: No suspicious renal lesion.  No hydronephrosis.   There is urinary bladder wall thickening.  Spleen: The spleen is normal size. Pancreas: The pancreas is grossly unremarkable.  GI systems and mesentery: No evidence of bowel obstruction.  The appendix is visualized and unremarkable in appearance.  No significant mesenteric inflammation.  Lymph nodes: No definite pathologically enlarged abdominal or pelvic lymph nodes present. Vessels: The aorta and abdominal arteries are grossly patent. The IVC and portal vein are patent and grossly unremarkable. Peritoneum: No free intraperitoneal fluid or pneumoperitoneum. Pelvic viscera: There is a sub-serosal uterine fibroid measuring about 7 cm x 5 cm.  Body wall: No body wall contusion. No significant body wall hernias.  Bones: No acute fracture.     Impression: Urinary bladder wall thickening, correlate with urinalysis for cystitis. This is a preliminary report.    CT Angiogram Chest Pulmonary Embolism    Result Date: 4/14/2024  FINAL REPORT TECHNIQUE: Multiple axial CT images were obtained through the chest following IV contrast using a CTA/PE protocol.  3D/MIP reconstruction images were also performed. This study was performed with techniques to keep radiation doses as low as reasonably achievable (ALARA). Individualized dose reduction techniques using automated exposure control or adjustment of mA and/or kV according to the patient's size were employed. CLINICAL HISTORY: anterior chest pain, soa, abdominal pain, n/v FINDINGS: PAs and aorta: No pulmonary embolus.  Thoracic aorta is unremarkable.  There is coronary artery disease. Heart/mediastinum: No evidence for right heart strain.  No pericardial effusion.  The heart is normal in size.   Lungs: Mild atelectasis.  Otherwise the lungs are clear.  Lymph nodes: No pathologically enlarged thoracic lymph nodes.  Pleura: No pneumothorax or pleural effusion.  Chest Wall: No chest wall contusion.  Bones: No acute fracture.     Impression: No pulmonary embolus.  No acute findings in the chest.  Coronary artery disease. Authenticated and Electronically Signed by Supa Flores MD on 04/14/2024 08:37:28 PM      HEART Score: 1    MDM  Patient evaluated in the ER for multiple complaints including chest pain, shortness of breath, abdominal pain, nausea, vomiting.  She is hemodynamically stable, afebrile, nontoxic-appearing on exam.  Differential diagnosis includes but is not limited to ACS, cardiac arrhythmia, GERD, ankle otitis, cholecystitis, UTI, kidney stone, among others.  Initial plan includes CBC, CMP, lipase, hCG qualitative, troponin, BNP, urinalysis, EKG, CT of chest, abdomen and pelvis.  Will treat initially with 1 L IV fluids, Reglan, Benadryl, Pepcid.    Labs remarkable for mild transaminitis but otherwise nonactionable.  Urinalysis not negative of an infectious process with no bacteria, leukocytes, or nitrites.  Patient feeling better after medications and fluids in the ER.  CT abdomen and pelvis reveals urinary bladder wall thickening with correlation with UA recommended.  Patient does note that she has some urinary hesitancy especially in the mornings.  Given urinary symptoms and CT findings of cystitis, will give ambulatory referral to urology.  CTPE scan revealed no PE but did reveal coronary artery disease.  Patient has normal troponin, symptoms been going on for 2 weeks.  EKG without any acute ischemic findings per my interpretation.  Patient agreeable with plan for discharge.  Ambulatory referral placed for cardiology, Dr. Coffey per patient request.  Prescription provided for Zofran.  Patient has follow-up with her PCP later this month.  Precautions were given for return to the ER for any  new or worsening symptoms.    Final diagnoses:   Cystitis   Coronary artery disease, unspecified vessel or lesion type, unspecified whether angina present, unspecified whether native or transplanted heart   Nausea and vomiting, unspecified vomiting type   Urinary hesitancy   Chest pain, unspecified type          Destiny Garcia, PA-C  04/14/24 0140

## 2024-04-15 NOTE — DISCHARGE INSTRUCTIONS
Take nausea medication as prescribed.  Follow-up with your PCP, urology, and cardiology for further outpatient evaluation of todays complaints and findings.  Return to the ER for new or worsening symptoms or acute concerns.

## 2024-04-18 PROCEDURE — 96375 TX/PRO/DX INJ NEW DRUG ADDON: CPT

## 2024-04-27 ENCOUNTER — LAB (OUTPATIENT)
Dept: LAB | Facility: HOSPITAL | Age: 39
End: 2024-04-27
Payer: COMMERCIAL

## 2024-04-27 ENCOUNTER — TRANSCRIBE ORDERS (OUTPATIENT)
Dept: LAB | Facility: HOSPITAL | Age: 39
End: 2024-04-27
Payer: COMMERCIAL

## 2024-04-27 DIAGNOSIS — K50.90 CROHN'S DISEASE WITHOUT COMPLICATION, UNSPECIFIED GASTROINTESTINAL TRACT LOCATION: ICD-10-CM

## 2024-04-27 DIAGNOSIS — E53.8 VITAMIN B12 DEFICIENCY (NON ANEMIC): ICD-10-CM

## 2024-04-27 DIAGNOSIS — E03.9 HYPOTHYROIDISM, ADULT: ICD-10-CM

## 2024-04-27 DIAGNOSIS — K50.90 CROHN'S DISEASE WITHOUT COMPLICATION, UNSPECIFIED GASTROINTESTINAL TRACT LOCATION: Primary | ICD-10-CM

## 2024-04-27 LAB
ALBUMIN SERPL-MCNC: 4.7 G/DL (ref 3.5–5.2)
ALBUMIN/GLOB SERPL: 1.6 G/DL
ALP SERPL-CCNC: 65 U/L (ref 39–117)
ALT SERPL W P-5'-P-CCNC: 104 U/L (ref 1–33)
ANION GAP SERPL CALCULATED.3IONS-SCNC: 17.7 MMOL/L (ref 5–15)
AST SERPL-CCNC: 152 U/L (ref 1–32)
BASOPHILS # BLD AUTO: 0.06 10*3/MM3 (ref 0–0.2)
BASOPHILS NFR BLD AUTO: 1.9 % (ref 0–1.5)
BILIRUB SERPL-MCNC: 0.4 MG/DL (ref 0–1.2)
BUN SERPL-MCNC: 7 MG/DL (ref 6–20)
BUN/CREAT SERPL: 6.9 (ref 7–25)
CALCIUM SPEC-SCNC: 9.3 MG/DL (ref 8.6–10.5)
CHLORIDE SERPL-SCNC: 98 MMOL/L (ref 98–107)
CHOLEST SERPL-MCNC: 298 MG/DL (ref 0–200)
CO2 SERPL-SCNC: 21.3 MMOL/L (ref 22–29)
CREAT SERPL-MCNC: 1.02 MG/DL (ref 0.57–1)
DEPRECATED RDW RBC AUTO: 61 FL (ref 37–54)
EGFRCR SERPLBLD CKD-EPI 2021: 71 ML/MIN/1.73
EOSINOPHIL # BLD AUTO: 0.02 10*3/MM3 (ref 0–0.4)
EOSINOPHIL NFR BLD AUTO: 0.6 % (ref 0.3–6.2)
ERYTHROCYTE [DISTWIDTH] IN BLOOD BY AUTOMATED COUNT: 14.9 % (ref 12.3–15.4)
FOLATE SERPL-MCNC: 11.3 NG/ML (ref 4.78–24.2)
GLOBULIN UR ELPH-MCNC: 2.9 GM/DL
GLUCOSE SERPL-MCNC: 69 MG/DL (ref 65–99)
HCT VFR BLD AUTO: 35.3 % (ref 34–46.6)
HDLC SERPL-MCNC: 78 MG/DL (ref 40–60)
HGB BLD-MCNC: 12.3 G/DL (ref 12–15.9)
LDLC SERPL CALC-MCNC: 206 MG/DL (ref 0–100)
LDLC/HDLC SERPL: 2.6 {RATIO}
LYMPHOCYTES # BLD AUTO: 1.58 10*3/MM3 (ref 0.7–3.1)
LYMPHOCYTES NFR BLD AUTO: 49.8 % (ref 19.6–45.3)
MCH RBC QN AUTO: 38.7 PG (ref 26.6–33)
MCHC RBC AUTO-ENTMCNC: 34.8 G/DL (ref 31.5–35.7)
MCV RBC AUTO: 111 FL (ref 79–97)
MONOCYTES # BLD AUTO: 0.36 10*3/MM3 (ref 0.1–0.9)
MONOCYTES NFR BLD AUTO: 11.4 % (ref 5–12)
NEUTROPHILS NFR BLD AUTO: 1.14 10*3/MM3 (ref 1.7–7)
NEUTROPHILS NFR BLD AUTO: 36 % (ref 42.7–76)
PLATELET # BLD AUTO: 175 10*3/MM3 (ref 140–450)
PMV BLD AUTO: 9.6 FL (ref 6–12)
POTASSIUM SERPL-SCNC: 4.7 MMOL/L (ref 3.5–5.2)
PROT SERPL-MCNC: 7.6 G/DL (ref 6–8.5)
RBC # BLD AUTO: 3.18 10*6/MM3 (ref 3.77–5.28)
SODIUM SERPL-SCNC: 137 MMOL/L (ref 136–145)
TRIGL SERPL-MCNC: 87 MG/DL (ref 0–150)
TSH SERPL DL<=0.05 MIU/L-ACNC: 90.2 UIU/ML (ref 0.27–4.2)
VIT B12 BLD-MCNC: 481 PG/ML (ref 211–946)
VLDLC SERPL-MCNC: 14 MG/DL (ref 5–40)
WBC NRBC COR # BLD AUTO: 3.17 10*3/MM3 (ref 3.4–10.8)

## 2024-04-27 PROCEDURE — 36415 COLL VENOUS BLD VENIPUNCTURE: CPT

## 2024-04-27 PROCEDURE — 82607 VITAMIN B-12: CPT

## 2024-04-27 PROCEDURE — 80061 LIPID PANEL: CPT

## 2024-04-27 PROCEDURE — 82746 ASSAY OF FOLIC ACID SERUM: CPT

## 2024-04-27 PROCEDURE — 80050 GENERAL HEALTH PANEL: CPT

## 2024-05-28 ENCOUNTER — LAB (OUTPATIENT)
Dept: LAB | Facility: HOSPITAL | Age: 39
End: 2024-05-28
Payer: COMMERCIAL

## 2024-05-28 ENCOUNTER — TRANSCRIBE ORDERS (OUTPATIENT)
Dept: LAB | Facility: HOSPITAL | Age: 39
End: 2024-05-28
Payer: COMMERCIAL

## 2024-05-28 DIAGNOSIS — K50.90 CROHN'S DISEASE WITHOUT COMPLICATION, UNSPECIFIED GASTROINTESTINAL TRACT LOCATION: Primary | ICD-10-CM

## 2024-05-28 DIAGNOSIS — K50.90 CROHN'S DISEASE WITHOUT COMPLICATION, UNSPECIFIED GASTROINTESTINAL TRACT LOCATION: ICD-10-CM

## 2024-05-28 LAB — CRP SERPL-MCNC: 0.45 MG/DL (ref 0–0.5)

## 2024-05-28 PROCEDURE — 36415 COLL VENOUS BLD VENIPUNCTURE: CPT

## 2024-05-28 PROCEDURE — 86140 C-REACTIVE PROTEIN: CPT

## 2025-01-09 ENCOUNTER — TRANSCRIBE ORDERS (OUTPATIENT)
Dept: ADMINISTRATIVE | Facility: HOSPITAL | Age: 40
End: 2025-01-09
Payer: COMMERCIAL

## 2025-01-09 DIAGNOSIS — K52.9 CHRONIC DIARRHEA: Primary | ICD-10-CM

## 2025-01-14 ENCOUNTER — HOSPITAL ENCOUNTER (OUTPATIENT)
Dept: CT IMAGING | Facility: HOSPITAL | Age: 40
Discharge: HOME | End: 2025-01-14
Admitting: INTERNAL MEDICINE
Payer: COMMERCIAL

## 2025-01-14 DIAGNOSIS — K52.9 CHRONIC DIARRHEA: ICD-10-CM

## 2025-01-14 PROCEDURE — 25510000001 IOPAMIDOL PER 1 ML: Performed by: INTERNAL MEDICINE

## 2025-01-14 PROCEDURE — 74177 CT ABD & PELVIS W/CONTRAST: CPT

## 2025-01-14 RX ORDER — IOPAMIDOL 755 MG/ML
150 INJECTION, SOLUTION INTRAVASCULAR
Status: COMPLETED | OUTPATIENT
Start: 2025-01-14 | End: 2025-01-14

## 2025-01-14 RX ADMIN — IOPAMIDOL 150 ML: 755 INJECTION, SOLUTION INTRAVENOUS at 14:43

## 2025-02-01 ENCOUNTER — LAB (OUTPATIENT)
Dept: LAB | Facility: HOSPITAL | Age: 40
End: 2025-02-01
Payer: COMMERCIAL

## 2025-02-01 ENCOUNTER — TRANSCRIBE ORDERS (OUTPATIENT)
Dept: LAB | Facility: HOSPITAL | Age: 40
End: 2025-02-01
Payer: COMMERCIAL

## 2025-02-01 DIAGNOSIS — R11.2 NAUSEA AND VOMITING IN ADULT: ICD-10-CM

## 2025-02-01 DIAGNOSIS — K50.819 CROHN'S DISEASE OF SMALL AND LARGE INTESTINES WITH COMPLICATION: ICD-10-CM

## 2025-02-01 DIAGNOSIS — R11.2 NAUSEA AND VOMITING IN ADULT: Primary | ICD-10-CM

## 2025-02-01 LAB
ALBUMIN SERPL-MCNC: 4.1 G/DL (ref 3.5–5.2)
ALBUMIN/GLOB SERPL: 1.5 G/DL
ALP SERPL-CCNC: 91 U/L (ref 39–117)
ALT SERPL W P-5'-P-CCNC: 227 U/L (ref 1–33)
ANION GAP SERPL CALCULATED.3IONS-SCNC: 12 MMOL/L (ref 5–15)
AST SERPL-CCNC: 301 U/L (ref 1–32)
BILIRUB SERPL-MCNC: 0.5 MG/DL (ref 0–1.2)
BUN SERPL-MCNC: 8 MG/DL (ref 6–20)
BUN/CREAT SERPL: 8.6 (ref 7–25)
CALCIUM SPEC-SCNC: 9.1 MG/DL (ref 8.6–10.5)
CHLORIDE SERPL-SCNC: 101 MMOL/L (ref 98–107)
CO2 SERPL-SCNC: 22 MMOL/L (ref 22–29)
CREAT SERPL-MCNC: 0.93 MG/DL (ref 0.57–1)
EGFRCR SERPLBLD CKD-EPI 2021: 78.9 ML/MIN/1.73
GLOBULIN UR ELPH-MCNC: 2.8 GM/DL
GLUCOSE SERPL-MCNC: 69 MG/DL (ref 65–99)
POTASSIUM SERPL-SCNC: 4.9 MMOL/L (ref 3.5–5.2)
PROT SERPL-MCNC: 6.9 G/DL (ref 6–8.5)
SODIUM SERPL-SCNC: 135 MMOL/L (ref 136–145)

## 2025-02-01 PROCEDURE — 36415 COLL VENOUS BLD VENIPUNCTURE: CPT

## 2025-02-01 PROCEDURE — 80053 COMPREHEN METABOLIC PANEL: CPT

## 2025-02-04 ENCOUNTER — LAB (OUTPATIENT)
Dept: LAB | Facility: HOSPITAL | Age: 40
End: 2025-02-04
Payer: COMMERCIAL

## 2025-02-04 LAB
C DIFF GDH + TOXINS A+B STL QL IA.RAPID: NEGATIVE
C DIFF GDH + TOXINS A+B STL QL IA.RAPID: NEGATIVE

## 2025-02-04 PROCEDURE — 82653 EL-1 FECAL QUANTITATIVE: CPT

## 2025-02-04 PROCEDURE — 83993 ASSAY FOR CALPROTECTIN FECAL: CPT

## 2025-02-04 PROCEDURE — 87324 CLOSTRIDIUM AG IA: CPT

## 2025-02-04 PROCEDURE — 89125 SPECIMEN FAT STAIN: CPT

## 2025-02-04 PROCEDURE — 87449 NOS EACH ORGANISM AG IA: CPT

## 2025-02-06 LAB
FATTY ACIDS: NORMAL
NEUTRAL FATS: NORMAL

## 2025-02-08 LAB — CALPROTECTIN STL-MCNT: 23 UG/G (ref 0–120)

## 2025-02-10 LAB — ELASTASE PANC STL-MCNT: >800 UG ELAST./G

## 2025-04-07 ENCOUNTER — OFFICE VISIT (OUTPATIENT)
Dept: INTERNAL MEDICINE | Facility: CLINIC | Age: 40
End: 2025-04-07
Payer: COMMERCIAL

## 2025-04-07 VITALS
TEMPERATURE: 98.2 F | WEIGHT: 178 LBS | HEIGHT: 63 IN | DIASTOLIC BLOOD PRESSURE: 70 MMHG | SYSTOLIC BLOOD PRESSURE: 120 MMHG | OXYGEN SATURATION: 97 % | BODY MASS INDEX: 31.54 KG/M2 | HEART RATE: 90 BPM

## 2025-04-07 DIAGNOSIS — R31.9 HEMATURIA, UNSPECIFIED TYPE: ICD-10-CM

## 2025-04-07 DIAGNOSIS — Z13.228 SCREENING FOR ENDOCRINE, METABOLIC AND IMMUNITY DISORDER: ICD-10-CM

## 2025-04-07 DIAGNOSIS — Z13.0 SCREENING FOR DISORDER OF BLOOD AND BLOOD-FORMING ORGANS: ICD-10-CM

## 2025-04-07 DIAGNOSIS — Z76.89 ENCOUNTER TO ESTABLISH CARE: Primary | ICD-10-CM

## 2025-04-07 DIAGNOSIS — F10.20 ALCOHOLISM: ICD-10-CM

## 2025-04-07 DIAGNOSIS — F39 MOOD DISORDER: ICD-10-CM

## 2025-04-07 DIAGNOSIS — N39.0 FREQUENT UTI: ICD-10-CM

## 2025-04-07 DIAGNOSIS — Z85.850 HISTORY OF THYROID CANCER: ICD-10-CM

## 2025-04-07 DIAGNOSIS — Z13.29 SCREENING FOR ENDOCRINE, METABOLIC AND IMMUNITY DISORDER: ICD-10-CM

## 2025-04-07 DIAGNOSIS — E03.9 ACQUIRED HYPOTHYROIDISM: ICD-10-CM

## 2025-04-07 DIAGNOSIS — Z13.0 SCREENING FOR ENDOCRINE, METABOLIC AND IMMUNITY DISORDER: ICD-10-CM

## 2025-04-07 DIAGNOSIS — Z13.220 SCREENING FOR LIPID DISORDERS: ICD-10-CM

## 2025-04-07 RX ORDER — ARIPIPRAZOLE 2 MG/1
2 TABLET ORAL DAILY
Qty: 30 TABLET | Refills: 1 | Status: SHIPPED | OUTPATIENT
Start: 2025-04-07

## 2025-04-07 RX ORDER — OMEPRAZOLE/SODIUM BICARBONATE 20MG-1.1G
CAPSULE ORAL
COMMUNITY

## 2025-04-07 NOTE — PROGRESS NOTES
Date: 2025    Name: Ashley Riley  : 1985    Chief Complaint:   Chief Complaint   Patient presents with    Establish Care     History of Present Illness  Ashley Riley is a pleasant 40 y.o. female who presents to [facility].      She has been diagnosed with hypothyroidism and is currently on a daily regimen of levothyroxine 175 mcg. Her last thyroid function test was conducted in 2024, which revealed abnormal results. She has not been taking any biotin or supplements. She admits to being non-compliant with her medication regimen over the past few years but has improved in the last 2 weeks. She is due for a refill of her levothyroxine prescription.    She has a history of Crohn's disease but has discontinued her medication due to the absence of symptoms. Despite experiencing chronic diarrhea, she reports an improvement in stool consistency since stopping the medication. Her last colonoscopy did not reveal any markers, and her Crohn's medication was not refilled. She has been on Crohn's medication for approximately 15 years. She has not been tested for celiac disease and reports no weight loss, instead noting weight gain. She has a history of fatty liver disease, and recent blood work at an urgent care facility raised concerns about her liver function.    She has a history of recurrent urinary tract infections (UTIs), with episodes in 2024, 2024, and 2025. She reports no history of kidney stones. She experiences discomfort during her menstrual cycle and notes a correlation between sexual activity and UTIs, although she does not develop a UTI after every sexual encounter.    She has been experiencing illness for the past 4 weeks, which has improved with antibiotics. She reports difficulty adjusting to weather changes and tends to fall ill during these periods.    She has a history of anxiety and depression and has previously tried medication, but she prefers not to take pills. She  occasionally takes propranolol, which she finds ineffective. She was prescribed Abilify by Dr. Moreland for nighttime use but does not take it regularly due to sleep disturbances. She believes consistent use of Abilify would be beneficial. She was also prescribed another medication for nightmares, which she has not taken for an extended period.    She has a history of B12 deficiency and used to receive monthly injections but has not done so recently. She is uncertain about a history of vitamin D deficiency.    She admits to excessive alcohol consumption, specifically wine, which she has been consuming nightly for approximately 1 year. This behavior began following a traumatic incident 2 years ago. She has sought counseling intermittently throughout her life but has not found it beneficial. She had a positive experience with a counselor in the past but has been unable to establish a similar connection with subsequent counselors.         History:  LMP: Patient's last menstrual period was 2025 (approximate).  Menarche in middle school  Sexual activity: monogamous, same partner for the past 8 months. Has not had sex with anyone else since STI screening done  Last pap date: 2 years  Abnormal pap? no  : 0  Para: 0  Has been told she cannot get pregnant.     Do you take any herbs or supplements that were not prescribed by a doctor?  Vitamin D occasionally       Health Habits:  Dental Exam. not up to date - brushes teeth infrequently   Eye Exam. not up to date - needs glasses, does not drive at night due to change in glasses   Current diet: does not eat much    History:    Past Medical History:   Diagnosis Date    Alcoholic liver damage     Anxiety     Bipolar disorder     Bronchitis     COVID-19 vaccine series completed     Crohn's disease     Depression     Drug-induced neutropenia     Ear piercing     GERD (gastroesophageal reflux disease)     H/O malignant neoplasm of thyroid     Nasal lesion     WEATHERS  (nonalcoholic steatohepatitis)     Olecranon bursitis     Seasonal allergies     Tattoo     Vitamin B12 deficiency        Past Surgical History:   Procedure Laterality Date    COLONOSCOPY      COLONOSCOPY N/A 4/11/2022    Procedure: COLONOSCOPY with biopsies polypectomy x 2;  Surgeon: Abhay Rascon MD;  Location: [facility] ENDOSCOPY;  Service: Gastroenterology;  Laterality: N/A;    ENDOSCOPY      THYROIDECTOMY      WISDOM TOOTH EXTRACTION         Family History   Problem Relation Age of Onset    Stomach cancer Father     Colon cancer Other         Grandparents       Social History     Socioeconomic History    Marital status: Single   Tobacco Use    Smoking status: Every Day     Current packs/day: 1.00     Average packs/day: 1 pack/day for 22.3 years (22.3 ttl pk-yrs)     Types: Cigarettes     Start date: 1/1/2003    Smokeless tobacco: Never   Vaping Use    Vaping status: Former    Substances: Nicotine, Flavoring    Devices: Disposable, Pre-filled or refillable cartridge, Refillable tank   Substance and Sexual Activity    Alcohol use: Yes     Alcohol/week: 14.0 standard drinks of alcohol     Types: 14 Glasses of wine per week     Comment: DAILY ALCOHOL USE    Drug use: Never    Sexual activity: Defer       Allergies   Allergen Reactions    Remicade [Infliximab] Anaphylaxis    Mirtazapine Other (See Comments)     Unsure of reaction    Nsaids GI Intolerance         Current Outpatient Medications:     cetirizine (zyrTEC) 10 MG tablet, Take 1 tablet by mouth Daily., Disp: , Rfl:     folic acid (FOLVITE) 1 MG tablet, Take 1 tablet by mouth Daily., Disp: 30 tablet, Rfl: 0    levothyroxine (SYNTHROID, LEVOTHROID) 175 MCG tablet, Take 1 tablet by mouth Daily., Disp: , Rfl:     ARIPiprazole (Abilify) 2 MG tablet, Take 1 tablet by mouth Daily., Disp: 30 tablet, Rfl: 1    Omeprazole-Sodium Bicarbonate (Zegerid)  MG capsule, Take  by mouth., Disp: , Rfl:     ROS:  Review of Systems    VS:  Vitals:    04/07/25 0853  "  BP: 120/70   Pulse: 90   Temp: 98.2 °F (36.8 °C)   SpO2: 97%   Weight: 80.7 kg (178 lb)   Height: 160 cm (62.99\")     Body mass index is 31.54 kg/m².         PE:  Physical Exam  Constitutional:       Appearance: She is obese. She is not ill-appearing.   HENT:      Head: Normocephalic.      Right Ear: External ear normal.      Left Ear: External ear normal.   Eyes:      Conjunctiva/sclera: Conjunctivae normal.      Pupils: Pupils are equal, round, and reactive to light.   Cardiovascular:      Rate and Rhythm: Normal rate and regular rhythm.      Heart sounds: Normal heart sounds.   Pulmonary:      Effort: Pulmonary effort is normal.      Breath sounds: Normal breath sounds.   Musculoskeletal:      Cervical back: Normal range of motion and neck supple.   Skin:     General: Skin is warm.      Capillary Refill: Capillary refill takes less than 2 seconds.   Neurological:      Mental Status: She is alert and oriented to person, place, and time.      Coordination: Coordination normal.      Gait: Gait normal.   Psychiatric:         Mood and Affect: Mood normal.         Behavior: Behavior normal.         Thought Content: Thought content normal.     Assessment/Plan:         Diagnoses and all orders for this visit:    1. Encounter to establish care (Primary)    2. Acquired hypothyroidism  -     TSH  -     T4, Free  - Continue levothyroxine as prescribed.  Dose may be adjusted based on lab results.      3. History of thyroid cancer    4. Alcoholism  -     Comprehensive Metabolic Panel  -     Vitamin B12  -     Folate    5. Mood disorder  -     ARIPiprazole (Abilify) 2 MG tablet; Take 1 tablet by mouth Daily.  Dispense: 30 tablet; Refill: 1        - Encouraged to take part in daily physical exercise.          - Eat healthy, well balanced diet; avoid sugary foods or beverages        - Limit alcohol intake. Abstain from drugs.          - Ensure good night's sleep by creating calm space in bedroom, avoiding screen time 1-2 hours " before bed, no caffeine after 5 pm        - Talk to supportive family and friends, as needed        - Consider journaling, other creative way to express feelings, if needed    6. Screening for endocrine, metabolic and immunity disorder  -     Comprehensive Metabolic Panel    7. Screening for disorder of blood and blood-forming organs  -     CBC & Differential    8. Screening for lipid disorders  -     Lipid Panel    9. Frequent UTI  -     Urinalysis With Microscopic - Urine, Clean Catch    10. Hematuria, unspecified type  -     Urinalysis With Microscopic - Urine, Clean Catch          Return in about 6 months (around 10/7/2025) for Annual.

## 2025-04-07 NOTE — PROGRESS NOTES
Office Visit      Patient Name: Ashley Riley  : 1985   MRN: [id number]     Chief Complaint:    Chief Complaint   Patient presents with    Establish Care       History of Present Illness: Ashley Riley is a 40 y.o. female who is here today     Subjective      I have reviewed and the following portions of the patient's history were updated as appropriate: past family history, past medical history, past social history, past surgical history and problem list.      Current Outpatient Medications:     cetirizine (zyrTEC) 10 MG tablet, Take 1 tablet by mouth Daily., Disp: , Rfl:     cholecalciferol (VITAMIN D3) 25 MCG (1000 UT) tablet, Take 1 tablet by mouth Daily., Disp: , Rfl:     clotrimazole (MYCELEX) 10 MG amos, Take 1 tablet by mouth 3 (Three) Times a Day., Disp: 10 tablet, Rfl: 2    Cyanocobalamin 1000 MCG/ML kit, Inject  as directed Every 30 (Thirty) Days., Disp: , Rfl:     esomeprazole (nexIUM) 40 MG capsule, Take 1 capsule by mouth Every Morning Before Breakfast., Disp: 14 capsule, Rfl: 0    fluticasone (FLONASE) 50 MCG/ACT nasal spray, 2 sprays into the nostril(s) as directed by provider Daily., Disp: , Rfl:     folic acid (FOLVITE) 1 MG tablet, Take 1 tablet by mouth Daily., Disp: 30 tablet, Rfl: 0    ketoconazole (NIZORAL) 2 % cream, Apply  topically to the appropriate area as directed Daily., Disp: , Rfl:     levothyroxine (SYNTHROID, LEVOTHROID) 175 MCG tablet, Take 1 tablet by mouth Daily., Disp: , Rfl:     Melatonin 5 MG capsule, Take  by mouth., Disp: , Rfl:     ondansetron ODT (ZOFRAN-ODT) 4 MG disintegrating tablet, Place 1 tablet on the tongue Every 8 (Eight) Hours As Needed for Nausea or Vomiting., Disp: 12 tablet, Rfl: 0    phenazopyridine (PYRIDIUM) 200 MG tablet, Take 1 tablet by mouth 3 (Three) Times a Day As Needed for Dysuria or Bladder Spasms., Disp: 6 tablet, Rfl: 0    propranolol (INDERAL) 40 MG tablet, Take 1 tablet by mouth Daily As Needed., Disp: , Rfl:     Stelara 90 MG/ML  "solution prefilled syringe Injection, MAINTENANCE: INJECT 1 SYRINGE SUBCUTANEOUSLY EVERY 8 WEEKS. REFRIGERATE. DO NOT FREEZE., Disp: 1 each, Rfl: 5    Allergies   Allergen Reactions    Remicade [Infliximab] Anaphylaxis    Mirtazapine Other (See Comments)     Unsure of reaction    Nsaids GI Intolerance       Objective     Physical Exam:  Vital Signs:   Vitals:    04/07/25 0853   BP: 120/70   Pulse: 90   Temp: 98.2 °F (36.8 °C)   SpO2: 97%   Weight: 80.7 kg (178 lb)   Height: 160 cm (62.99\")     Body mass index is 31.54 kg/m².  {BMI is >= 30 and <35. (Class 1 Obesity). The following options were offered after discussion; (Optional):67653}       Physical Exam        Assessment / Plan      Assessment/Plan:   There are no diagnoses linked to this encounter.         Follow Up:   No follow-ups on file.    Patient was given instructions and counseling regarding her condition or for health maintenance advice. Please see specific information pulled into the AVS if appropriate.       [facility]     Please note that portions of this note may have been completed with a voice recognition program. Efforts were made to edit dictation, but occasionally words are mistranscribed.   "

## 2025-04-22 PROBLEM — Z85.850 HISTORY OF THYROID CANCER: Status: ACTIVE | Noted: 2025-04-22

## 2025-04-22 PROBLEM — E03.9 ACQUIRED HYPOTHYROIDISM: Status: ACTIVE | Noted: 2025-04-22

## 2025-04-22 PROBLEM — F39 MOOD DISORDER: Status: ACTIVE | Noted: 2025-04-22

## 2025-08-12 ENCOUNTER — OFFICE VISIT (OUTPATIENT)
Dept: INTERNAL MEDICINE | Facility: CLINIC | Age: 40
End: 2025-08-12
Payer: COMMERCIAL

## 2025-08-12 VITALS
SYSTOLIC BLOOD PRESSURE: 118 MMHG | TEMPERATURE: 98 F | BODY MASS INDEX: 28.37 KG/M2 | HEART RATE: 60 BPM | RESPIRATION RATE: 16 BRPM | DIASTOLIC BLOOD PRESSURE: 72 MMHG | HEIGHT: 63 IN | OXYGEN SATURATION: 98 % | WEIGHT: 160.12 LBS

## 2025-08-12 DIAGNOSIS — K21.9 GASTROESOPHAGEAL REFLUX DISEASE WITHOUT ESOPHAGITIS: ICD-10-CM

## 2025-08-12 DIAGNOSIS — F41.8 DEPRESSION WITH ANXIETY: ICD-10-CM

## 2025-08-12 DIAGNOSIS — R09.82 POSTNASAL DRIP: ICD-10-CM

## 2025-08-12 DIAGNOSIS — E53.8 FOLIC ACID DEFICIENCY: ICD-10-CM

## 2025-08-12 DIAGNOSIS — K50.80 CROHN'S DISEASE OF BOTH SMALL AND LARGE INTESTINE WITHOUT COMPLICATION: ICD-10-CM

## 2025-08-12 DIAGNOSIS — F10.90 ALCOHOL USE DISORDER: Primary | ICD-10-CM

## 2025-08-12 DIAGNOSIS — E53.8 COBALAMIN DEFICIENCY: ICD-10-CM

## 2025-08-12 PROBLEM — G56.00 CARPAL TUNNEL SYNDROME: Status: ACTIVE | Noted: 2021-03-23

## 2025-08-12 PROBLEM — Z85.850 HISTORY OF THYROID CANCER: Chronic | Status: ACTIVE | Noted: 2017-01-27

## 2025-08-12 PROBLEM — K73.9 CHRONIC HEPATITIS: Chronic | Status: ACTIVE | Noted: 2021-05-15

## 2025-08-12 PROBLEM — E55.9 VITAMIN D DEFICIENCY: Status: ACTIVE | Noted: 2023-02-07

## 2025-08-12 PROBLEM — K50.919 CROHN'S DISEASE WITH COMPLICATION: Chronic | Status: ACTIVE | Noted: 2023-02-07

## 2025-08-12 PROBLEM — R87.619 ABNORMAL CERVICAL PAPANICOLAOU SMEAR: Status: ACTIVE | Noted: 2021-05-15

## 2025-08-12 PROBLEM — E78.5 HYPERLIPIDEMIA: Status: ACTIVE | Noted: 2022-02-08

## 2025-08-12 PROBLEM — E89.0 POSTOPERATIVE HYPOTHYROIDISM: Status: ACTIVE | Noted: 2017-07-12

## 2025-08-12 PROBLEM — C73 THYROID CANCER: Chronic | Status: ACTIVE | Noted: 2017-01-27

## 2025-08-12 PROBLEM — L40.9 PSORIASIS: Status: ACTIVE | Noted: 2020-04-02

## 2025-08-12 PROCEDURE — 99214 OFFICE O/P EST MOD 30 MIN: CPT | Performed by: PHYSICIAN ASSISTANT

## 2025-08-12 RX ORDER — AZELASTINE 1 MG/ML
2 SPRAY, METERED NASAL 2 TIMES DAILY
Qty: 30 ML | Refills: 11 | Status: SHIPPED | OUTPATIENT
Start: 2025-08-12

## 2025-08-12 RX ORDER — ONDANSETRON 4 MG/1
4 TABLET, ORALLY DISINTEGRATING ORAL EVERY 8 HOURS PRN
Qty: 20 TABLET | Refills: 1 | Status: SHIPPED | OUTPATIENT
Start: 2025-08-12

## 2025-08-12 RX ORDER — PROPRANOLOL HYDROCHLORIDE 10 MG/1
10 TABLET ORAL 2 TIMES DAILY PRN
Qty: 90 TABLET | Refills: 1 | Status: SHIPPED | OUTPATIENT
Start: 2025-08-12

## 2025-08-12 RX ORDER — HYDROXYZINE PAMOATE 50 MG/1
50 CAPSULE ORAL EVERY 6 HOURS PRN
Qty: 360 CAPSULE | Refills: 1 | Status: SHIPPED | OUTPATIENT
Start: 2025-08-12

## 2025-08-12 RX ORDER — ARIPIPRAZOLE 10 MG/1
TABLET ORAL
COMMUNITY
Start: 2025-08-11 | End: 2025-08-12 | Stop reason: SDUPTHER

## 2025-08-12 RX ORDER — THIAMINE MONONITRATE (VIT B1) 100 MG
100 TABLET ORAL DAILY
Qty: 90 TABLET | Refills: 1 | Status: SHIPPED | OUTPATIENT
Start: 2025-08-12

## 2025-08-12 RX ORDER — ARIPIPRAZOLE 10 MG/1
10 TABLET ORAL DAILY
Qty: 90 TABLET | Refills: 1 | Status: SHIPPED | OUTPATIENT
Start: 2025-08-12

## 2025-08-12 RX ORDER — FAMOTIDINE 40 MG/1
40 TABLET, FILM COATED ORAL DAILY
Qty: 90 TABLET | Refills: 1 | Status: SHIPPED | OUTPATIENT
Start: 2025-08-12

## 2025-08-12 RX ORDER — FOLIC ACID 1 MG/1
1 TABLET ORAL DAILY
Qty: 90 TABLET | Refills: 1 | Status: SHIPPED | OUTPATIENT
Start: 2025-08-12

## 2025-08-12 RX ORDER — HYDROXYZINE PAMOATE 50 MG/1
50 CAPSULE ORAL EVERY 6 HOURS PRN
COMMUNITY
Start: 2025-08-11 | End: 2025-08-12 | Stop reason: SDUPTHER

## 2025-08-13 LAB
ALBUMIN SERPL-MCNC: 4.1 G/DL (ref 3.5–5.2)
ALBUMIN/GLOB SERPL: 1.6 G/DL
ALP SERPL-CCNC: 70 U/L (ref 39–117)
ALT SERPL-CCNC: 21 U/L (ref 1–33)
APPEARANCE UR: CLEAR
AST SERPL-CCNC: 22 U/L (ref 1–32)
BACTERIA #/AREA URNS HPF: NORMAL /HPF
BILIRUB SERPL-MCNC: 0.3 MG/DL (ref 0–1.2)
BILIRUB UR QL STRIP: NEGATIVE
BUN SERPL-MCNC: 5 MG/DL (ref 6–20)
BUN/CREAT SERPL: 6.8 (ref 7–25)
CALCIUM SERPL-MCNC: 9.3 MG/DL (ref 8.6–10.5)
CHLORIDE SERPL-SCNC: 103 MMOL/L (ref 98–107)
CHOLEST SERPL-MCNC: 218 MG/DL (ref 0–200)
CO2 SERPL-SCNC: 23.6 MMOL/L (ref 22–29)
COLOR UR: YELLOW
CREAT SERPL-MCNC: 0.73 MG/DL (ref 0.57–1)
DIFFERENTIAL COMMENT: NORMAL
EGFRCR SERPLBLD CKD-EPI 2021: 105.5 ML/MIN/1.73
EOSINOPHIL # BLD MANUAL: 0.04 10*3/MM3 (ref 0–0.4)
EOSINOPHIL NFR BLD MANUAL: 1 % (ref 0.3–6.2)
EPI CELLS #/AREA URNS HPF: NORMAL /HPF
ERYTHROCYTE [DISTWIDTH] IN BLOOD BY AUTOMATED COUNT: 15.1 % (ref 12.3–15.4)
FOLATE SERPL-MCNC: 12.3 NG/ML (ref 4.78–24.2)
GLOBULIN SER CALC-MCNC: 2.6 GM/DL
GLUCOSE SERPL-MCNC: 118 MG/DL (ref 65–99)
GLUCOSE UR QL STRIP: NEGATIVE
HCT VFR BLD AUTO: 37.2 % (ref 34–46.6)
HDLC SERPL-MCNC: 32 MG/DL (ref 40–60)
HGB BLD-MCNC: 12.3 G/DL (ref 12–15.9)
HGB UR QL STRIP: NEGATIVE
KETONES UR QL STRIP: NEGATIVE
LDLC SERPL CALC-MCNC: 157 MG/DL (ref 0–100)
LEUKOCYTE ESTERASE UR QL STRIP: NEGATIVE
LYMPHOCYTES # BLD MANUAL: 1.91 10*3/MM3 (ref 0.7–3.1)
LYMPHOCYTES NFR BLD MANUAL: 45 % (ref 19.6–45.3)
MCH RBC QN AUTO: 34.2 PG (ref 26.6–33)
MCHC RBC AUTO-ENTMCNC: 33.1 G/DL (ref 31.5–35.7)
MCV RBC AUTO: 103.3 FL (ref 79–97)
MONOCYTES # BLD MANUAL: 0.3 10*3/MM3 (ref 0.1–0.9)
MONOCYTES NFR BLD MANUAL: 7 % (ref 5–12)
NEUTROPHILS # BLD MANUAL: 2 10*3/MM3 (ref 1.7–7)
NEUTROPHILS NFR BLD MANUAL: 47 % (ref 42.7–76)
NITRITE UR QL STRIP: NEGATIVE
NRBC BLD AUTO-RTO: 0 /100 WBC (ref 0–0.2)
PH UR STRIP: 7 [PH] (ref 5–8)
PLATELET # BLD AUTO: 367 10*3/MM3 (ref 140–450)
PLATELET BLD QL SMEAR: NORMAL
POTASSIUM SERPL-SCNC: 4.6 MMOL/L (ref 3.5–5.2)
PROT SERPL-MCNC: 6.7 G/DL (ref 6–8.5)
PROT UR QL STRIP: NEGATIVE
RBC # BLD AUTO: 3.6 10*6/MM3 (ref 3.77–5.28)
RBC #/AREA URNS HPF: NORMAL /HPF
RBC MORPH BLD: NORMAL
SODIUM SERPL-SCNC: 138 MMOL/L (ref 136–145)
SP GR UR STRIP: 1 (ref 1–1.03)
T4 FREE SERPL-MCNC: 1.81 NG/DL (ref 0.92–1.68)
TRIGL SERPL-MCNC: 159 MG/DL (ref 0–150)
TSH SERPL DL<=0.005 MIU/L-ACNC: 0.27 UIU/ML (ref 0.27–4.2)
UROBILINOGEN UR STRIP-MCNC: ABNORMAL MG/DL
VIT B12 SERPL-MCNC: 237 PG/ML (ref 211–946)
VLDLC SERPL CALC-MCNC: 29 MG/DL (ref 5–40)
WBC # BLD AUTO: 4.25 10*3/MM3 (ref 3.4–10.8)
WBC #/AREA URNS HPF: NORMAL /HPF

## (undated) DEVICE — HYBRID TUBING/CAP SET FOR OLYMPUS® SCOPES: Brand: ERBE

## (undated) DEVICE — VLV SXN AIR/H2O ORCAPOD3 1P/U STRL

## (undated) DEVICE — Device

## (undated) DEVICE — ENDOSCOPY PORT CONNECTOR FOR OLYMPUS® SCOPES: Brand: ERBE

## (undated) DEVICE — LUBE JELLY PK/2.75GM STRL BX/144